# Patient Record
Sex: FEMALE | Race: WHITE | NOT HISPANIC OR LATINO | Employment: PART TIME
[De-identification: names, ages, dates, MRNs, and addresses within clinical notes are randomized per-mention and may not be internally consistent; named-entity substitution may affect disease eponyms.]

---

## 2017-01-05 ENCOUNTER — RECORDS - HEALTHEAST (OUTPATIENT)
Dept: ADMINISTRATIVE | Facility: OTHER | Age: 52
End: 2017-01-05

## 2017-01-20 ENCOUNTER — OFFICE VISIT - HEALTHEAST (OUTPATIENT)
Dept: FAMILY MEDICINE | Facility: CLINIC | Age: 52
End: 2017-01-20

## 2017-01-20 DIAGNOSIS — F32.89 DEPRESSIVE DISORDER, NOT ELSEWHERE CLASSIFIED: ICD-10-CM

## 2017-01-20 DIAGNOSIS — E03.8 SUBCLINICAL HYPOTHYROIDISM: ICD-10-CM

## 2017-01-20 DIAGNOSIS — F41.9 ANXIETY: ICD-10-CM

## 2017-01-20 DIAGNOSIS — I10 ESSENTIAL HYPERTENSION: ICD-10-CM

## 2017-01-20 ASSESSMENT — MIFFLIN-ST. JEOR: SCORE: 1598.22

## 2017-01-25 ENCOUNTER — RECORDS - HEALTHEAST (OUTPATIENT)
Dept: ADMINISTRATIVE | Facility: OTHER | Age: 52
End: 2017-01-25

## 2017-01-27 ENCOUNTER — AMBULATORY - HEALTHEAST (OUTPATIENT)
Dept: FAMILY MEDICINE | Facility: CLINIC | Age: 52
End: 2017-01-27

## 2017-01-27 ENCOUNTER — AMBULATORY - HEALTHEAST (OUTPATIENT)
Dept: LAB | Facility: CLINIC | Age: 52
End: 2017-01-27

## 2017-01-27 DIAGNOSIS — I10 ESSENTIAL HYPERTENSION: ICD-10-CM

## 2017-01-27 DIAGNOSIS — E03.8 SUBCLINICAL HYPOTHYROIDISM: ICD-10-CM

## 2017-01-27 DIAGNOSIS — Z13.9 SCREENING: ICD-10-CM

## 2017-01-27 LAB
CHOLEST SERPL-MCNC: 215 MG/DL
FASTING STATUS PATIENT QL REPORTED: YES
HDLC SERPL-MCNC: 49 MG/DL
LDLC SERPL CALC-MCNC: 124 MG/DL
TRIGL SERPL-MCNC: 209 MG/DL

## 2017-04-03 ENCOUNTER — HOSPITAL ENCOUNTER (OUTPATIENT)
Dept: MAMMOGRAPHY | Facility: CLINIC | Age: 52
Discharge: HOME | End: 2017-04-03
Attending: OBSTETRICS & GYNECOLOGY

## 2017-04-03 DIAGNOSIS — Z12.31 VISIT FOR SCREENING MAMMOGRAM: ICD-10-CM

## 2017-07-22 ENCOUNTER — COMMUNICATION - HEALTHEAST (OUTPATIENT)
Dept: FAMILY MEDICINE | Facility: CLINIC | Age: 52
End: 2017-07-22

## 2017-07-22 DIAGNOSIS — F32.89 DEPRESSIVE DISORDER, NOT ELSEWHERE CLASSIFIED: ICD-10-CM

## 2017-07-22 DIAGNOSIS — I10 ESSENTIAL HYPERTENSION: ICD-10-CM

## 2017-08-23 ENCOUNTER — RECORDS - HEALTHEAST (OUTPATIENT)
Dept: ADMINISTRATIVE | Facility: OTHER | Age: 52
End: 2017-08-23

## 2017-08-28 ENCOUNTER — OFFICE VISIT - HEALTHEAST (OUTPATIENT)
Dept: FAMILY MEDICINE | Facility: CLINIC | Age: 52
End: 2017-08-28

## 2017-08-28 DIAGNOSIS — M54.9 UPPER BACK PAIN ON LEFT SIDE: ICD-10-CM

## 2017-08-29 ENCOUNTER — OFFICE VISIT - HEALTHEAST (OUTPATIENT)
Dept: PHYSICAL THERAPY | Facility: REHABILITATION | Age: 52
End: 2017-08-29

## 2017-08-29 DIAGNOSIS — F41.9 ANXIETY: ICD-10-CM

## 2017-08-29 DIAGNOSIS — F32.89 DEPRESSIVE DISORDER, NOT ELSEWHERE CLASSIFIED: ICD-10-CM

## 2017-08-29 DIAGNOSIS — I10 HYPERTENSION: ICD-10-CM

## 2017-08-29 DIAGNOSIS — M25.612 SHOULDER STIFFNESS, LEFT: ICD-10-CM

## 2017-08-29 DIAGNOSIS — M25.512 ACUTE PAIN OF LEFT SHOULDER: ICD-10-CM

## 2017-08-30 ENCOUNTER — COMMUNICATION - HEALTHEAST (OUTPATIENT)
Dept: FAMILY MEDICINE | Facility: CLINIC | Age: 52
End: 2017-08-30

## 2017-08-31 ENCOUNTER — AMBULATORY - HEALTHEAST (OUTPATIENT)
Dept: FAMILY MEDICINE | Facility: CLINIC | Age: 52
End: 2017-08-31

## 2017-08-31 DIAGNOSIS — Z13.9 SCREENING: ICD-10-CM

## 2017-08-31 DIAGNOSIS — K21.9 GERD (GASTROESOPHAGEAL REFLUX DISEASE): ICD-10-CM

## 2017-10-23 ENCOUNTER — RECORDS - HEALTHEAST (OUTPATIENT)
Dept: ADMINISTRATIVE | Facility: OTHER | Age: 52
End: 2017-10-23

## 2017-10-26 ENCOUNTER — RECORDS - HEALTHEAST (OUTPATIENT)
Dept: ADMINISTRATIVE | Facility: OTHER | Age: 52
End: 2017-10-26

## 2017-11-13 ENCOUNTER — COMMUNICATION - HEALTHEAST (OUTPATIENT)
Dept: FAMILY MEDICINE | Facility: CLINIC | Age: 52
End: 2017-11-13

## 2017-11-14 ENCOUNTER — RECORDS - HEALTHEAST (OUTPATIENT)
Dept: ADMINISTRATIVE | Facility: OTHER | Age: 52
End: 2017-11-14

## 2017-11-22 ENCOUNTER — RECORDS - HEALTHEAST (OUTPATIENT)
Dept: ADMINISTRATIVE | Facility: OTHER | Age: 52
End: 2017-11-22

## 2017-12-01 ENCOUNTER — RECORDS - HEALTHEAST (OUTPATIENT)
Dept: ADMINISTRATIVE | Facility: OTHER | Age: 52
End: 2017-12-01

## 2017-12-11 ENCOUNTER — RECORDS - HEALTHEAST (OUTPATIENT)
Dept: ADMINISTRATIVE | Facility: OTHER | Age: 52
End: 2017-12-11

## 2017-12-20 ENCOUNTER — RECORDS - HEALTHEAST (OUTPATIENT)
Dept: ADMINISTRATIVE | Facility: OTHER | Age: 52
End: 2017-12-20

## 2018-01-23 ENCOUNTER — COMMUNICATION - HEALTHEAST (OUTPATIENT)
Dept: FAMILY MEDICINE | Facility: CLINIC | Age: 53
End: 2018-01-23

## 2018-01-23 DIAGNOSIS — I10 ESSENTIAL HYPERTENSION: ICD-10-CM

## 2018-01-27 ENCOUNTER — COMMUNICATION - HEALTHEAST (OUTPATIENT)
Dept: FAMILY MEDICINE | Facility: CLINIC | Age: 53
End: 2018-01-27

## 2018-05-04 ENCOUNTER — COMMUNICATION - HEALTHEAST (OUTPATIENT)
Dept: FAMILY MEDICINE | Facility: CLINIC | Age: 53
End: 2018-05-04

## 2018-05-04 DIAGNOSIS — F41.9 ANXIETY: ICD-10-CM

## 2018-05-04 DIAGNOSIS — F32.A DEPRESSION: ICD-10-CM

## 2018-05-04 DIAGNOSIS — I10 ESSENTIAL HYPERTENSION: ICD-10-CM

## 2018-08-01 ENCOUNTER — COMMUNICATION - HEALTHEAST (OUTPATIENT)
Dept: FAMILY MEDICINE | Facility: CLINIC | Age: 53
End: 2018-08-01

## 2018-08-01 DIAGNOSIS — I10 ESSENTIAL HYPERTENSION: ICD-10-CM

## 2018-08-09 ENCOUNTER — OFFICE VISIT - HEALTHEAST (OUTPATIENT)
Dept: FAMILY MEDICINE | Facility: CLINIC | Age: 53
End: 2018-08-09

## 2018-08-09 DIAGNOSIS — F41.9 ANXIETY: ICD-10-CM

## 2018-08-09 DIAGNOSIS — F32.0 MILD MAJOR DEPRESSION (H): ICD-10-CM

## 2018-08-09 DIAGNOSIS — K21.9 ESOPHAGEAL REFLUX: ICD-10-CM

## 2018-08-09 DIAGNOSIS — I10 ESSENTIAL HYPERTENSION: ICD-10-CM

## 2018-10-29 ENCOUNTER — OFFICE VISIT - HEALTHEAST (OUTPATIENT)
Dept: FAMILY MEDICINE | Facility: CLINIC | Age: 53
End: 2018-10-29

## 2018-10-29 DIAGNOSIS — H81.10 BPV (BENIGN POSITIONAL VERTIGO): ICD-10-CM

## 2018-10-29 DIAGNOSIS — Z12.31 VISIT FOR SCREENING MAMMOGRAM: ICD-10-CM

## 2019-06-17 ENCOUNTER — OFFICE VISIT - HEALTHEAST (OUTPATIENT)
Dept: FAMILY MEDICINE | Facility: CLINIC | Age: 54
End: 2019-06-17

## 2019-06-17 DIAGNOSIS — F51.01 PRIMARY INSOMNIA: ICD-10-CM

## 2019-06-17 DIAGNOSIS — F32.0 MILD MAJOR DEPRESSION (H): ICD-10-CM

## 2019-06-23 ENCOUNTER — COMMUNICATION - HEALTHEAST (OUTPATIENT)
Dept: FAMILY MEDICINE | Facility: CLINIC | Age: 54
End: 2019-06-23

## 2019-07-01 ENCOUNTER — COMMUNICATION - HEALTHEAST (OUTPATIENT)
Dept: FAMILY MEDICINE | Facility: CLINIC | Age: 54
End: 2019-07-01

## 2019-07-01 DIAGNOSIS — F51.01 PRIMARY INSOMNIA: ICD-10-CM

## 2019-07-14 ENCOUNTER — COMMUNICATION - HEALTHEAST (OUTPATIENT)
Dept: FAMILY MEDICINE | Facility: CLINIC | Age: 54
End: 2019-07-14

## 2019-07-14 DIAGNOSIS — F51.01 PRIMARY INSOMNIA: ICD-10-CM

## 2019-07-18 ENCOUNTER — COMMUNICATION - HEALTHEAST (OUTPATIENT)
Dept: FAMILY MEDICINE | Facility: CLINIC | Age: 54
End: 2019-07-18

## 2019-07-26 ENCOUNTER — COMMUNICATION - HEALTHEAST (OUTPATIENT)
Dept: FAMILY MEDICINE | Facility: CLINIC | Age: 54
End: 2019-07-26

## 2019-07-26 DIAGNOSIS — F51.01 PRIMARY INSOMNIA: ICD-10-CM

## 2019-07-29 ENCOUNTER — COMMUNICATION - HEALTHEAST (OUTPATIENT)
Dept: FAMILY MEDICINE | Facility: CLINIC | Age: 54
End: 2019-07-29

## 2019-07-30 ENCOUNTER — AMBULATORY - HEALTHEAST (OUTPATIENT)
Dept: FAMILY MEDICINE | Facility: CLINIC | Age: 54
End: 2019-07-30

## 2019-07-30 DIAGNOSIS — F51.01 PRIMARY INSOMNIA: ICD-10-CM

## 2019-07-31 ENCOUNTER — COMMUNICATION - HEALTHEAST (OUTPATIENT)
Dept: FAMILY MEDICINE | Facility: CLINIC | Age: 54
End: 2019-07-31

## 2019-07-31 DIAGNOSIS — F41.9 ANXIETY: ICD-10-CM

## 2019-07-31 DIAGNOSIS — F32.0 MILD MAJOR DEPRESSION (H): ICD-10-CM

## 2019-07-31 DIAGNOSIS — I10 ESSENTIAL HYPERTENSION: ICD-10-CM

## 2019-08-20 ENCOUNTER — RECORDS - HEALTHEAST (OUTPATIENT)
Dept: ADMINISTRATIVE | Facility: OTHER | Age: 54
End: 2019-08-20

## 2019-08-23 ENCOUNTER — COMMUNICATION - HEALTHEAST (OUTPATIENT)
Dept: FAMILY MEDICINE | Facility: CLINIC | Age: 54
End: 2019-08-23

## 2019-08-23 DIAGNOSIS — F51.01 PRIMARY INSOMNIA: ICD-10-CM

## 2019-09-22 ENCOUNTER — COMMUNICATION - HEALTHEAST (OUTPATIENT)
Dept: FAMILY MEDICINE | Facility: CLINIC | Age: 54
End: 2019-09-22

## 2019-09-22 DIAGNOSIS — F51.01 PRIMARY INSOMNIA: ICD-10-CM

## 2020-01-02 ENCOUNTER — RECORDS - HEALTHEAST (OUTPATIENT)
Dept: ADMINISTRATIVE | Facility: OTHER | Age: 55
End: 2020-01-02

## 2020-01-06 ENCOUNTER — RECORDS - HEALTHEAST (OUTPATIENT)
Dept: ADMINISTRATIVE | Facility: OTHER | Age: 55
End: 2020-01-06

## 2020-02-17 ENCOUNTER — OFFICE VISIT - HEALTHEAST (OUTPATIENT)
Dept: FAMILY MEDICINE | Facility: CLINIC | Age: 55
End: 2020-02-17

## 2020-02-17 DIAGNOSIS — J20.9 BRONCHITIS WITH BRONCHOSPASM: ICD-10-CM

## 2020-02-17 RX ORDER — SPIRONOLACTONE 100 MG/1
TABLET, FILM COATED ORAL
Refills: 5 | Status: SHIPPED | COMMUNITY
Start: 2019-08-29 | End: 2024-02-14

## 2020-02-17 RX ORDER — ALBUTEROL SULFATE 90 UG/1
2 AEROSOL, METERED RESPIRATORY (INHALATION) EVERY 6 HOURS PRN
Qty: 18 G | Refills: 11 | Status: SHIPPED | OUTPATIENT
Start: 2020-02-17 | End: 2024-02-14

## 2020-06-12 ENCOUNTER — COMMUNICATION - HEALTHEAST (OUTPATIENT)
Dept: FAMILY MEDICINE | Facility: CLINIC | Age: 55
End: 2020-06-12

## 2020-06-12 DIAGNOSIS — Z23 NEED FOR VACCINATION: ICD-10-CM

## 2020-06-14 ENCOUNTER — COMMUNICATION - HEALTHEAST (OUTPATIENT)
Dept: FAMILY MEDICINE | Facility: CLINIC | Age: 55
End: 2020-06-14

## 2020-06-14 DIAGNOSIS — F51.01 PRIMARY INSOMNIA: ICD-10-CM

## 2020-06-15 ENCOUNTER — AMBULATORY - HEALTHEAST (OUTPATIENT)
Dept: LAB | Facility: CLINIC | Age: 55
End: 2020-06-15

## 2020-06-15 DIAGNOSIS — Z23 NEED FOR VACCINATION: ICD-10-CM

## 2020-06-16 LAB
MEV IGG SER IA-ACNC: POSITIVE
MUV IGG SER QL IA: POSITIVE
RUBV IGG SERPL QL IA: POSITIVE

## 2020-07-05 ENCOUNTER — VIRTUAL VISIT (OUTPATIENT)
Dept: FAMILY MEDICINE | Facility: OTHER | Age: 55
End: 2020-07-05

## 2020-07-05 DIAGNOSIS — Z20.822 COVID-19 RULED OUT: Primary | ICD-10-CM

## 2020-07-05 PROCEDURE — 99207 ZZC NO BILLABLE SERVICE THIS VISIT: CPT

## 2020-07-05 PROCEDURE — U0003 INFECTIOUS AGENT DETECTION BY NUCLEIC ACID (DNA OR RNA); SEVERE ACUTE RESPIRATORY SYNDROME CORONAVIRUS 2 (SARS-COV-2) (CORONAVIRUS DISEASE [COVID-19]), AMPLIFIED PROBE TECHNIQUE, MAKING USE OF HIGH THROUGHPUT TECHNOLOGIES AS DESCRIBED BY CMS-2020-01-R: HCPCS | Performed by: FAMILY MEDICINE

## 2020-07-05 NOTE — LETTER
July 7, 2020        Gualberto Saunders  [address] [facility] [address]    This letter provides a written record that you were tested for COVID-19 on  7/5/20      Your result was negative. This means that we didn t find the virus that causes COVID-19 in your sample. A test may show negative when you do actually have the virus. This can happen when the virus is in the early stages of infection, before you feel illness symptoms.    If you have symptoms   Stay home and away from others (self-isolate) until you meet ALL of the guidelines below:    You ve had no fever--and no medicine that reduces fever--for 3 full days (72 hours). And      Your other symptoms have gotten better. For example, your cough or breathing has improved. And     At least 10 days have passed since your symptoms started.    During this time:    Stay home. Don t go to work, school or anywhere else.     Stay in your own room, including for meals. Use your own bathroom if you can.    Stay away from others in your home. No hugging, kissing or shaking hands. No visitors.    Clean  high touch  surfaces often (doorknobs, counters, handles, etc.). Use a household cleaning spray or wipes. You can find a full list on the EPA website at www.epa.gov/pesticide-registration/list-n-disinfectants-use-against-sars-cov-2.    Cover your mouth and nose with a mask, tissue or washcloth to avoid spreading germs.    Wash your hands and face often with soap and water.    Going back to work  Check with your employer for any guidelines to follow for going back to work.    Employers: This document serves as formal notice that your employee tested negative for COVID-19, as of the testing date shown above.

## 2020-07-06 LAB
SARS-COV-2 RNA SPEC QL NAA+PROBE: NOT DETECTED
SPECIMEN SOURCE: NORMAL

## 2020-07-07 NOTE — PROGRESS NOTES
"Date: 2020 12:53:54  Clinician: Kely Trivedi  Clinician NPI: [id number]  Patient: Gualberto Saunders  Patient : 1965  Patient Address: [address] [place] [address]  Patient Phone: [phone]  Visit Protocol: URI  Patient Summary:  Gualberto is a 55 year old ( : 1965 ) female who initiated a Visit for COVID-19 (Coronavirus) evaluation and screening. When asked the question \"Please sign me up to receive news, health information and promotions from [employer].\", Gualberto responded \"No\".    Gualberto states her symptoms started 1-2 days ago.   Her symptoms consist of nausea, diarrhea, vomiting, malaise, a headache, and myalgia.   Symptom details   Headache: She states the headache is moderate (4-6 on a 10 point pain scale).    Gualberto denies having wheezing, teeth pain, ageusia, rhinitis, ear pain, chills, sore throat, enlarged lymph nodes, anosmia, facial pain or pressure, fever, cough, and nasal congestion. She also denies having recent facial or sinus surgery in the past 60 days and taking antibiotic medication in the past month. She is not experiencing dyspnea.   Precipitating events  She has not recently been exposed to someone with influenza. Gualberto has been in close contact with the following high risk individuals: children under the age of 5, people with asthma, heart disease or diabetes, and adults 65 or older.   Pertinent COVID-19 (Coronavirus) information  In the past 14 days, Gualberto has not worked in a congregate living setting.   She either works or volunteers as a healthcare worker or a , or works or volunteers in a healthcare facility. She provides direct patient care. 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 She lives with a healthcare worker.   Gualberto has had a close contact with a laboratory-confirmed " COVID-19 patient within 14 days of symptom onset. She was not exposed at her work. Additional information about contact with COVID-19 (Coronavirus) patient as reported by the patient (free text): Exposed 7 days ago to son in laws sister who I had close contact with   Pertinent medical history  Gualberto does not get yeast infections when she takes antibiotics.   Gualberto does not need a return to work/school note.   Weight: 199 lbs   Gualberto does not smoke or use smokeless tobacco.   Weight: 199 lbs    MEDICATIONS: Pepcid oral, Lexapro oral, atenolol oral, ALLERGIES: NKDA  Clinician Response:  Dear Gualberto,   Your symptoms show that you may have coronavirus (COVID-19). This illness can cause fever, cough and trouble breathing. Many people get a mild case and get better on their own. Some people can get very sick.  What should I do?  We would like to test you for this virus.   1. Please call [phone] to schedule your visit. Explain that you were referred by [facility] to have a COVID-19 test. Be ready to share your [facility] visit ID number.  The following will serve as your written order for this COVID Test, ordered by me, for the indication of suspected COVID [Z20.828]: The test will be ordered in [facility] [employer], our electronic health record, after you are scheduled. It will show as ordered and authorized by Denis Ludwig MD.  Order: COVID-19 (Coronavirus) PCR for SYMPTOMATIC testing from [facility].      2. When it's time for your COVID test:  Stay at least 6 feet away from others. (If someone will drive you to your test, stay in the backseat, as far away from the  as you can.)   Cover your mouth and nose with a mask, tissue or washcloth.  Go straight to the testing site. Don't make any stops on the way there or back.      3.Starting now: Stay home and away from others (self-isolate) until:   You've had no fever---and no medicine that reduces fever---for 3 full days (72 hours). And...   Your other symptoms have gotten better. For  "example, your cough or breathing has improved. And...   At least 10 days have passed since your symptoms started.       During this time, don't leave the house except for testing or medical care.   Stay in your own room, even for meals. Use your own bathroom if you can.   Stay away from others in your home. No hugging, kissing or shaking hands. No visitors.  Don't go to work, school or anywhere else.    Clean \"high touch\" surfaces often (doorknobs, counters, handles, etc.). Use a household cleaning spray or wipes. You'll find a full list of  on the EPA website: www.epa.gov/pesticide-registration/list-n-disinfectants-use-against-sars-cov-2.   Cover your mouth and nose with a mask, tissue or washcloth to avoid spreading germs.  Wash your hands and face often. Use soap and water.  Caregivers in these groups are at risk for severe illness due to COVID-19:  o People 65 years and older  o People who live in a nursing home or long-term care facility  o People with chronic disease (lung, heart, cancer, diabetes, kidney, liver, immunologic)  o People who have a weakened immune system, including those who:   Are in cancer treatment  Take medicine that weakens the immune system, such as corticosteroids  Had a bone marrow or organ transplant  Have an immune deficiency  Have poorly controlled HIV or AIDS  Are obese (body mass index of 40 or higher)  Smoke regularly   o Caregivers should wear gloves while washing dishes, handling laundry and cleaning bedrooms and bathrooms.  o Use caution when washing and drying laundry: Don't shake dirty laundry, and use the warmest water setting that you can.  o For more tips, go to www.cdc.gov/coronavirus/2019-ncov/downloads/10Things.pdf.    4.Sign up for [employer] [employer]. We know it's scary to hear that you might have COVID-19. We want to track your symptoms to make sure you're okay over the next 2 weeks. Please look for an email from [employer]---this is a free, online program that " we'll use to keep in touch. To sign up, follow the link in the email. Learn more at [email or web address]  How can I take care of myself?   Get lots of rest. Drink extra fluids (unless a doctor has told you not to).   Take Tylenol (acetaminophen) for fever or pain. If you have liver or kidney problems, ask your family doctor if it's okay to take Tylenol.   Adults can take either:    650 mg (two 325 mg pills) every 4 to 6 hours, or...   1,000 mg (two 500 mg pills) every 8 hours as needed.    Note: Don't take more than 3,000 mg in one day. Acetaminophen is found in many medicines (both prescribed and over-the-counter medicines). Read all labels to be sure you don't take too much.   For children, check the Tylenol bottle for the right dose. The dose is based on the child's age or weight.    If you have other health problems (like cancer, heart failure, an organ transplant or severe kidney disease): Call your specialty clinic if you don't feel better in the next 2 days.       Know when to call 911. Emergency warning signs include:    Trouble breathing or shortness of breath Pain or pressure in the chest that doesn't go away Feeling confused like you haven't felt before, or not being able to wake up Bluish-colored lips or face.  Where can I get more information?   [facility] -- About COVID-19: [email or web address]   CDC -- What to Do If You're Sick: www.cdc.gov/coronavirus/2019-ncov/about/steps-when-sick.html   CDC -- Ending Home Isolation: www.cdc.gov/coronavirus/2019-ncov/hcp/disposition-in-home-patients.html   CDC -- Caring for Someone: www.cdc.gov/coronavirus/2019-ncov/if-you-are-sick/care-for-someone.html   [facility] -- Interim Guidance for Hospital Discharge to Home: www.health.[facility].mn.us/diseases/coronavirus/hcp/hospdischarge.pdf   [facility] clinical trials (COVID-19 research studies): clinicalaffairs.[facility]/umn-clinical-trials    Below are the COVID-19 hotlines at the  [place] Department of [facility] ([facility]). Interpreters are available.    For health questions: Call 132-566-8497 or 1-864.685.2514 (7 a.m. to 7 p.m.) For questions about schools and childcare: Call 733-422-9852 or 1-886.789.5112 (7 a.m. to 7 p.m.)    Diagnosis: Myalgia  Diagnosis ICD: M79.1

## 2020-07-10 ENCOUNTER — COMMUNICATION - HEALTHEAST (OUTPATIENT)
Dept: FAMILY MEDICINE | Facility: CLINIC | Age: 55
End: 2020-07-10

## 2020-07-11 ENCOUNTER — COMMUNICATION - HEALTHEAST (OUTPATIENT)
Dept: FAMILY MEDICINE | Facility: CLINIC | Age: 55
End: 2020-07-11

## 2020-07-11 DIAGNOSIS — Z13.9 SCREENING FOR CONDITION: ICD-10-CM

## 2020-07-11 DIAGNOSIS — Z00.00 ROUTINE GENERAL MEDICAL EXAMINATION AT A HEALTH CARE FACILITY: ICD-10-CM

## 2020-07-14 ENCOUNTER — AMBULATORY - HEALTHEAST (OUTPATIENT)
Dept: LAB | Facility: CLINIC | Age: 55
End: 2020-07-14

## 2020-07-14 ENCOUNTER — OFFICE VISIT - HEALTHEAST (OUTPATIENT)
Dept: FAMILY MEDICINE | Facility: CLINIC | Age: 55
End: 2020-07-14

## 2020-07-14 DIAGNOSIS — F32.0 MILD MAJOR DEPRESSION (H): ICD-10-CM

## 2020-07-14 DIAGNOSIS — Z13.9 SCREENING FOR CONDITION: ICD-10-CM

## 2020-07-14 DIAGNOSIS — J45.20 MILD INTERMITTENT ASTHMA WITHOUT COMPLICATION: ICD-10-CM

## 2020-07-14 DIAGNOSIS — E03.8 SUBCLINICAL HYPOTHYROIDISM: ICD-10-CM

## 2020-07-14 DIAGNOSIS — Z00.00 ROUTINE GENERAL MEDICAL EXAMINATION AT A HEALTH CARE FACILITY: ICD-10-CM

## 2020-07-14 DIAGNOSIS — Z76.89 ENCOUNTER TO ESTABLISH CARE: ICD-10-CM

## 2020-07-14 DIAGNOSIS — I10 ESSENTIAL HYPERTENSION: ICD-10-CM

## 2020-07-14 DIAGNOSIS — Z13.71 BRCA NEGATIVE: ICD-10-CM

## 2020-07-14 DIAGNOSIS — Z20.822 SUSPECTED COVID-19 VIRUS INFECTION: ICD-10-CM

## 2020-07-14 DIAGNOSIS — K21.9 GASTROESOPHAGEAL REFLUX DISEASE WITHOUT ESOPHAGITIS: ICD-10-CM

## 2020-07-14 DIAGNOSIS — K44.9 HIATAL HERNIA: ICD-10-CM

## 2020-07-14 DIAGNOSIS — F41.9 ANXIETY: ICD-10-CM

## 2020-07-14 LAB
ANION GAP SERPL CALCULATED.3IONS-SCNC: 12 MMOL/L (ref 5–18)
BUN SERPL-MCNC: 10 MG/DL (ref 8–22)
CALCIUM SERPL-MCNC: 9.4 MG/DL (ref 8.5–10.5)
CHLORIDE BLD-SCNC: 104 MMOL/L (ref 98–107)
CHOLEST SERPL-MCNC: 225 MG/DL
CO2 SERPL-SCNC: 24 MMOL/L (ref 22–31)
CREAT SERPL-MCNC: 0.73 MG/DL (ref 0.6–1.1)
ERYTHROCYTE [DISTWIDTH] IN BLOOD BY AUTOMATED COUNT: 12.2 % (ref 11–14.5)
FASTING STATUS PATIENT QL REPORTED: YES
GFR SERPL CREATININE-BSD FRML MDRD: >60 ML/MIN/1.73M2
GLUCOSE BLD-MCNC: 97 MG/DL (ref 70–125)
HCT VFR BLD AUTO: 47.1 % (ref 35–47)
HDLC SERPL-MCNC: 44 MG/DL
HGB BLD-MCNC: 16.5 G/DL (ref 12–16)
LDLC SERPL CALC-MCNC: 135 MG/DL
MCH RBC QN AUTO: 34.2 PG (ref 27–34)
MCHC RBC AUTO-ENTMCNC: 35.1 G/DL (ref 32–36)
MCV RBC AUTO: 97 FL (ref 80–100)
PLATELET # BLD AUTO: 228 THOU/UL (ref 140–440)
PMV BLD AUTO: 7.9 FL (ref 7–10)
POTASSIUM BLD-SCNC: 4.9 MMOL/L (ref 3.5–5)
RBC # BLD AUTO: 4.84 MILL/UL (ref 3.8–5.4)
SODIUM SERPL-SCNC: 140 MMOL/L (ref 136–145)
T4 FREE SERPL-MCNC: 0.7 NG/DL (ref 0.7–1.8)
TRIGL SERPL-MCNC: 230 MG/DL
TSH SERPL DL<=0.005 MIU/L-ACNC: 7.99 UIU/ML (ref 0.3–5)
WBC: 7.2 THOU/UL (ref 4–11)

## 2020-07-14 RX ORDER — DEXAMETHASONE 4 MG/1
TABLET ORAL
Qty: 1 INHALER | Refills: 12 | Status: SHIPPED | OUTPATIENT
Start: 2020-07-14 | End: 2022-01-22

## 2020-07-14 RX ORDER — ESCITALOPRAM OXALATE 10 MG/1
10 TABLET ORAL DAILY
Qty: 90 TABLET | Refills: 3 | Status: SHIPPED | OUTPATIENT
Start: 2020-07-14 | End: 2021-09-20

## 2020-07-14 ASSESSMENT — ANXIETY QUESTIONNAIRES
1. FEELING NERVOUS, ANXIOUS, OR ON EDGE: NOT AT ALL
IF YOU CHECKED OFF ANY PROBLEMS ON THIS QUESTIONNAIRE, HOW DIFFICULT HAVE THESE PROBLEMS MADE IT FOR YOU TO DO YOUR WORK, TAKE CARE OF THINGS AT HOME, OR GET ALONG WITH OTHER PEOPLE: NOT DIFFICULT AT ALL
5. BEING SO RESTLESS THAT IT IS HARD TO SIT STILL: NOT AT ALL
4. TROUBLE RELAXING: SEVERAL DAYS
3. WORRYING TOO MUCH ABOUT DIFFERENT THINGS: NOT AT ALL
7. FEELING AFRAID AS IF SOMETHING AWFUL MIGHT HAPPEN: NOT AT ALL
2. NOT BEING ABLE TO STOP OR CONTROL WORRYING: NOT AT ALL
GAD7 TOTAL SCORE: 1
6. BECOMING EASILY ANNOYED OR IRRITABLE: NOT AT ALL

## 2020-07-14 ASSESSMENT — PATIENT HEALTH QUESTIONNAIRE - PHQ9: SUM OF ALL RESPONSES TO PHQ QUESTIONS 1-9: 2

## 2020-07-14 ASSESSMENT — MIFFLIN-ST. JEOR: SCORE: 1637.46

## 2020-07-15 LAB
25(OH)D3 SERPL-MCNC: 46.6 NG/ML (ref 30–80)
25(OH)D3 SERPL-MCNC: 46.6 NG/ML (ref 30–80)

## 2020-07-16 ENCOUNTER — COMMUNICATION - HEALTHEAST (OUTPATIENT)
Dept: PEDIATRICS | Facility: CLINIC | Age: 55
End: 2020-07-16

## 2020-07-16 ENCOUNTER — COMMUNICATION - HEALTHEAST (OUTPATIENT)
Dept: FAMILY MEDICINE | Facility: CLINIC | Age: 55
End: 2020-07-16

## 2020-07-20 ENCOUNTER — OFFICE VISIT - HEALTHEAST (OUTPATIENT)
Dept: FAMILY MEDICINE | Facility: CLINIC | Age: 55
End: 2020-07-20

## 2020-07-20 DIAGNOSIS — E78.2 MIXED HYPERLIPIDEMIA: ICD-10-CM

## 2020-07-20 DIAGNOSIS — R74.8 LOW SERUM HDL: ICD-10-CM

## 2020-07-20 DIAGNOSIS — E78.1 HIGH TRIGLYCERIDES: ICD-10-CM

## 2020-07-20 DIAGNOSIS — E03.8 SUBCLINICAL HYPOTHYROIDISM: ICD-10-CM

## 2020-07-21 ENCOUNTER — COMMUNICATION - HEALTHEAST (OUTPATIENT)
Dept: FAMILY MEDICINE | Facility: CLINIC | Age: 55
End: 2020-07-21

## 2020-07-21 ENCOUNTER — COMMUNICATION - HEALTHEAST (OUTPATIENT)
Dept: LAB | Facility: CLINIC | Age: 55
End: 2020-07-21

## 2020-09-11 ENCOUNTER — COMMUNICATION - HEALTHEAST (OUTPATIENT)
Dept: FAMILY MEDICINE | Facility: CLINIC | Age: 55
End: 2020-09-11

## 2020-09-11 DIAGNOSIS — I10 ESSENTIAL HYPERTENSION: ICD-10-CM

## 2020-09-11 DIAGNOSIS — Z20.822 EXPOSURE TO COVID-19 VIRUS: ICD-10-CM

## 2020-09-12 ENCOUNTER — AMBULATORY - HEALTHEAST (OUTPATIENT)
Dept: FAMILY MEDICINE | Facility: CLINIC | Age: 55
End: 2020-09-12

## 2020-09-12 DIAGNOSIS — Z20.822 EXPOSURE TO COVID-19 VIRUS: ICD-10-CM

## 2020-09-14 ENCOUNTER — COMMUNICATION - HEALTHEAST (OUTPATIENT)
Dept: SCHEDULING | Facility: CLINIC | Age: 55
End: 2020-09-14

## 2020-10-13 ENCOUNTER — COMMUNICATION - HEALTHEAST (OUTPATIENT)
Dept: FAMILY MEDICINE | Facility: CLINIC | Age: 55
End: 2020-10-13

## 2020-10-13 ENCOUNTER — AMBULATORY - HEALTHEAST (OUTPATIENT)
Dept: LAB | Facility: CLINIC | Age: 55
End: 2020-10-13

## 2020-10-13 ENCOUNTER — AMBULATORY - HEALTHEAST (OUTPATIENT)
Dept: FAMILY MEDICINE | Facility: CLINIC | Age: 55
End: 2020-10-13

## 2020-10-13 DIAGNOSIS — E03.8 SUBCLINICAL HYPOTHYROIDISM: ICD-10-CM

## 2020-10-13 LAB — TSH SERPL DL<=0.005 MIU/L-ACNC: 5.8 UIU/ML (ref 0.3–5)

## 2021-01-20 ENCOUNTER — AMBULATORY - HEALTHEAST (OUTPATIENT)
Dept: NURSING | Facility: CLINIC | Age: 56
End: 2021-01-20

## 2021-02-08 ENCOUNTER — AMBULATORY - HEALTHEAST (OUTPATIENT)
Dept: NURSING | Facility: CLINIC | Age: 56
End: 2021-02-08

## 2021-02-25 ENCOUNTER — RECORDS - HEALTHEAST (OUTPATIENT)
Dept: ADMINISTRATIVE | Facility: OTHER | Age: 56
End: 2021-02-25

## 2021-03-08 ENCOUNTER — COMMUNICATION - HEALTHEAST (OUTPATIENT)
Dept: FAMILY MEDICINE | Facility: CLINIC | Age: 56
End: 2021-03-08

## 2021-03-08 DIAGNOSIS — F51.01 PRIMARY INSOMNIA: ICD-10-CM

## 2021-03-08 RX ORDER — DOXEPIN HYDROCHLORIDE 10 MG/1
10 CAPSULE ORAL AT BEDTIME
Qty: 90 CAPSULE | Refills: 1 | Status: SHIPPED | OUTPATIENT
Start: 2021-03-08 | End: 2021-09-13

## 2021-03-31 ENCOUNTER — HOSPITAL ENCOUNTER (OUTPATIENT)
Dept: MAMMOGRAPHY | Facility: CLINIC | Age: 56
Discharge: HOME | End: 2021-03-31
Attending: OBSTETRICS & GYNECOLOGY

## 2021-03-31 DIAGNOSIS — Z12.31 VISIT FOR SCREENING MAMMOGRAM: ICD-10-CM

## 2021-04-10 ENCOUNTER — COMMUNICATION - HEALTHEAST (OUTPATIENT)
Dept: FAMILY MEDICINE | Facility: CLINIC | Age: 56
End: 2021-04-10

## 2021-04-10 DIAGNOSIS — E03.8 SUBCLINICAL HYPOTHYROIDISM: ICD-10-CM

## 2021-04-12 RX ORDER — LEVOTHYROXINE SODIUM 50 UG/1
TABLET ORAL
Qty: 90 TABLET | Refills: 1 | Status: SHIPPED | OUTPATIENT
Start: 2021-04-12 | End: 2021-10-12

## 2021-05-25 ENCOUNTER — RECORDS - HEALTHEAST (OUTPATIENT)
Dept: ADMINISTRATIVE | Facility: CLINIC | Age: 56
End: 2021-05-25

## 2021-05-26 ENCOUNTER — RECORDS - HEALTHEAST (OUTPATIENT)
Dept: ADMINISTRATIVE | Facility: CLINIC | Age: 56
End: 2021-05-26

## 2021-05-27 ASSESSMENT — PATIENT HEALTH QUESTIONNAIRE - PHQ9: SUM OF ALL RESPONSES TO PHQ QUESTIONS 1-9: 2

## 2021-05-28 ASSESSMENT — ANXIETY QUESTIONNAIRES: GAD7 TOTAL SCORE: 1

## 2021-05-28 ASSESSMENT — ASTHMA QUESTIONNAIRES: ACT_TOTALSCORE: 25

## 2021-05-29 ENCOUNTER — RECORDS - HEALTHEAST (OUTPATIENT)
Dept: ADMINISTRATIVE | Facility: CLINIC | Age: 56
End: 2021-05-29

## 2021-05-29 NOTE — PROGRESS NOTES
ASSESSMENT/PLAN:  Primary insomnia  54-year-old female with long-standing insomnia who resented today because her usual combination of melatonin and Unisom have not been as effective.  I will try her on trazodone.  We spoke about side effects of trazodone.  We spoke about sleep hygiene and to avoid bright light at least 30 minutes prior to bedtime.  Follow-up in the next 2 to 4 weeks if she has further questions or concerns.  The patient verbalized understanding and agree with the plan  -     traZODone (DESYREL) 50 MG tablet; Take 1 tablet (50 mg total) by mouth at bedtime.    Mild major depression (H)  Stable on Lexapro  No change of medication    SUBJECTIVE:    Gualberto Saunders is a 54 y.o. female who came in today for insomnia.  Patient has had insomnia since she turned 51 years old which was 3 years ago.  Since then she has been using melatonin 10 mg in addition to Unisom.  This combination of medication has not been as effective.  She describes difficulty with falling asleep.  States that she watches TV until she falls asleep.  While watching TV, usually around 9:30-9:40 is when she gets sleepy.  However when she goes to her room to sleep she finds herself wide-awake.  Denies depression or anxiety symptoms causing her insomnia.  She does not nap during the day.  States that Lexapro has been helping with her symptoms.  She wakes up usually around 5:00 in the morning to go to work    Review of Systems (except those mentioned above)  Constitutional: Negative.   HENT: Negative.   Eyes: Negative.   Respiratory: Negative.   Cardiovascular: Negative.   Gastrointestinal: Negative.   Endocrine: Negative.   Genitourinary: Negative.   Musculoskeletal: Negative.   Skin: Negative.   Allergic/Immunologic: Negative.   Neurological: Negative.   Hematological: Negative.   Psychiatric/Behavioral: Negative.     Patient Active Problem List    Diagnosis Date Noted     Mild major depression (H) 08/09/2018     Anxiety 01/20/2017      S/P hysterectomy 10/20/2015     Subclinical hypothyroidism 06/05/2015     Hypertension 06/04/2015     Perimenopause 06/04/2015     Vitiligo 12/02/2014     Basal Cell Carcinoma Of The Skin      Cystocele      Esophageal Reflux      Metatarsal Spur Of The Left Foot      Allergies   Allergen Reactions     Vicryl Sutures Swelling     Current Outpatient Medications   Medication Sig Dispense Refill     albuterol (PROVENTIL HFA;VENTOLIN HFA) 90 mcg/actuation inhaler Inhale 2 puffs every 6 (six) hours as needed for wheezing. 18 g 11     atenolol (TENORMIN) 25 MG tablet Take 1 tablet (25 mg total) by mouth daily. 90 tablet 3     escitalopram oxalate (LEXAPRO) 10 MG tablet Take 1 tablet (10 mg total) by mouth daily. 90 tablet 3     FLOVENT  mcg/actuation inhaler TAKE 1 PUFF BY MOUTH TWICE A DAY  12     omeprazole (PRILOSEC) 10 MG capsule Take 10 mg by mouth daily before breakfast.       lansoprazole (PREVACID) 30 MG capsule Take 30 mg by mouth daily.       traZODone (DESYREL) 50 MG tablet Take 1 tablet (50 mg total) by mouth at bedtime. 30 tablet 0     No current facility-administered medications for this visit.      Past Medical History:   Diagnosis Date     Depression      Elevated hemoglobin (H)      Hypertension      PONV (postoperative nausea and vomiting)      Uterine fibroid      Past Surgical History:   Procedure Laterality Date     ABLATION SAPHENOUS VEIN W/ RFA       HYSTERECTOMY  2015     [place] LAP,DIAGNOSTIC ABDOMEN      Description: Laparoscopy (Diagnostic);  Recorded: 09/03/2009;  Comments: for infertility     [place] LIGATE FALLOPIAN TUBE      Description: Tubal Ligation;  Recorded: 03/23/2009;     [place] REDUCTION OF LARGE BREAST      Description: Breast Surgery Reduction Procedure Elective;  Proc Date: 08/01/2009;     [place] REMOVE TONSILS/ADENOIDS,<13 Y/O      Description: Tonsillectomy With Adenoidectomy;  Recorded: 03/23/2009;     [place] VAGINAL HYSTERECTOMY,UTERUS 250 GMS/< N/A 10/20/2015    Procedure: HYSTERECTOMY  VAGINAL;  Surgeon: Dominique Ngo MD;  Location: [facility] [place] OR;  Service: Gynecology     REDUCTION MAMMAPLASTY  2007     WISDOM TOOTH EXTRACTION       Social History     Socioeconomic History     Marital status:      Spouse name: None     Number of children: None     Years of education: None     Highest education level: None   Occupational History     Occupation: nurse     Employer: [employer]   Social Needs     Financial resource strain: None     Food insecurity:     Worry: None     Inability: None     Transportation needs:     Medical: None     Non-medical: None   Tobacco Use     Smoking status: Never Smoker     Smokeless tobacco: Never Used   Substance and Sexual Activity     Alcohol use: Yes     Alcohol/week: 0.5 oz     Types: 1 drink(s) per week     Drug use: None     Sexual activity: Yes     Partners: Male   Lifestyle     Physical activity:     Days per week: None     Minutes per session: None     Stress: None   Relationships     Social connections:     Talks on phone: None     Gets together: None     Attends [employer] service: None     Active member of club or organization: None     Attends meetings of clubs or organizations: None     Relationship status: None     Intimate partner violence:     Fear of current or ex partner: None     Emotionally abused: None     Physically abused: None     Forced sexual activity: None   Other Topics Concern     None   Social History Narrative     None     Family History   Problem Relation Age of Onset     Cancer Father         thyroid     Prostate cancer Father      Idiopathic pulmonary fibrosis Father      No Medical Problems Brother      Other Mother         ARDS (acute respiratory distress syndrome)     Pancreatitis Mother         exploratory laparotomy revealed absence of pancreas, possibly autoimmune     Breast cancer Paternal Grandmother 35     Colon polyps Daughter         pre CA- 22         OBJECTIVE:    Vitals:    06/17/19 1528   BP: 112/78    Patient Site: Left Arm   Patient Position: Sitting   Cuff Size: Adult Regular   Pulse: 68   Weight: 202 lb 6 oz (91.8 kg)     Body mass index is 30.55 kg/m .    Physical Exam:  Constitutional: Patient is oriented to person, place, and time. Patient appears well-developed and well-nourished. No distress.   Head: Normocephalic and atraumatic.   Right Ear: External ear normal.   Left Ear: External ear normal.   Nose: Nose normal.   Eyes: Conjunctivae and EOM are normal. Right eye exhibits no discharge. Left eye exhibits no discharge. No scleral icterus.   Neurological: Patient is alert and oriented to person, place, and time. Patient has normal reflexes. No cranial nerve deficit. Coordination normal.   Skin: No rash noted. Patient is not diaphoretic. No erythema. No pallor.  The patient has good eye contact.  No psychomotor retardation or stereotypical behaviors.  Speech was regular rate, regular rhythm, adequate responses.  Mood was stable and affect was congruent mood.  No suicidal or homicidal intent.  No hallucination.

## 2021-05-30 VITALS — HEIGHT: 68 IN | WEIGHT: 208.5 LBS | BODY MASS INDEX: 31.6 KG/M2

## 2021-05-30 NOTE — TELEPHONE ENCOUNTER
RN cannot approve Refill Request    RN can NOT refill this medication change in dose sent in 7/2/19. . Last office visit: 6/17/2019 Pablito Bobby MD Last Physical: Visit date not found Last MTM visit: Visit date not found Last visit same specialty: 6/17/2019 Pablito Bobby MD.  Next visit within 3 mo: Visit date not found  Next physical within 3 mo: Visit date not found      Andreia Coelho, [facility] Triage/Med Refill 7/14/2019    Requested Prescriptions   Pending Prescriptions Disp Refills     traZODone (DESYREL) 50 MG tablet [Pharmacy Med Name: TRAZODONE 50 MG TABLET] 30 tablet 0     Sig: TAKE 1 TABLET BY MOUTH EVERYDAY AT BEDTIME       Tricyclics/Misc Antidepressant/Antianxiety Meds Refill Protocol Passed - 7/14/2019 12:26 AM        Passed - PCP or prescribing provider visit in last year     Last office visit with prescriber/PCP: 6/17/2019 Pablito Bobby MD OR same dept: 6/17/2019 Pablito Bobby MD OR same specialty: 6/17/2019 Pablito Bobby MD  Last physical: Visit date not found Last MTM visit: Visit date not found   Next visit within 3 mo: Visit date not found  Next physical within 3 mo: Visit date not found  Prescriber OR PCP: Pablito Walker MD  Last diagnosis associated with med order: 1. Primary insomnia  - traZODone (DESYREL) 50 MG tablet [Pharmacy Med Name: TRAZODONE 50 MG TABLET]; TAKE 1 TABLET BY MOUTH EVERYDAY AT BEDTIME  Dispense: 30 tablet; Refill: 0    If protocol passes may refill for 12 months if within 3 months of last provider visit (or a total of 15 months).

## 2021-05-30 NOTE — TELEPHONE ENCOUNTER
Who is calling:  The patient   Reason for Call:  The patient is calling to check the status of the medication request. The patient is aware that the script is pending review. The patient is requesting an expedited refill due to being nearly out of the medication.   Date of last appointment with primary care: 10/29/2018  Okay to leave a detailed message: Yes

## 2021-05-31 VITALS — BODY MASS INDEX: 29.89 KG/M2 | WEIGHT: 198 LBS

## 2021-05-31 NOTE — TELEPHONE ENCOUNTER
Refill Approved    Rx renewed per Medication Renewal Policy. Medication was last renewed on 8/9/18.    Rachele Barajas, [facility] Triage/Med Refill 7/31/2019     Requested Prescriptions   Pending Prescriptions Disp Refills     atenolol (TENORMIN) 25 MG tablet [Pharmacy Med Name: ATENOLOL 25 MG TABLET] 90 tablet 3     Sig: TAKE 1 TABLET BY MOUTH EVERY DAY       Beta-Blockers Refill Protocol Passed - 7/31/2019  1:00 AM        Passed - PCP or prescribing provider visit in past 12 months or next 3 months     Last office visit with prescriber/PCP: 6/17/2019 Pablito Bobby MD OR same dept: 6/17/2019 Pablito Bobby MD OR same specialty: 6/17/2019 Pablito Bobby MD  Last physical: Visit date not found Last MTM visit: Visit date not found   Next visit within 3 mo: Visit date not found  Next physical within 3 mo: Visit date not found  Prescriber OR PCP: Pablito Walker MD  Last diagnosis associated with med order: 1. Essential hypertension  - atenolol (TENORMIN) 25 MG tablet [Pharmacy Med Name: ATENOLOL 25 MG TABLET]; TAKE 1 TABLET BY MOUTH EVERY DAY  Dispense: 90 tablet; Refill: 3    2. Anxiety  - escitalopram oxalate (LEXAPRO) 10 MG tablet [Pharmacy Med Name: ESCITALOPRAM 10 MG TABLET]; TAKE 1 TABLET BY MOUTH EVERY DAY  Dispense: 90 tablet; Refill: 3    3. Mild major depression (H)  - escitalopram oxalate (LEXAPRO) 10 MG tablet [Pharmacy Med Name: ESCITALOPRAM 10 MG TABLET]; TAKE 1 TABLET BY MOUTH EVERY DAY  Dispense: 90 tablet; Refill: 3    If protocol passes may refill for 12 months if within 3 months of last provider visit (or a total of 15 months).             Passed - Blood pressure filed in past 12 months     BP Readings from Last 1 Encounters:   06/17/19 112/78             escitalopram oxalate (LEXAPRO) 10 MG tablet [Pharmacy Med Name: ESCITALOPRAM 10 MG TABLET] 90 tablet 3     Sig: TAKE 1 TABLET BY MOUTH EVERY DAY       SSRI Refill Protocol  Passed -  7/31/2019  1:00 AM        Passed - PCP or prescribing provider visit in last year     Last office visit with prescriber/PCP: 6/17/2019 Pablito Bobby MD OR same dept: 6/17/2019 Pablito Bobby MD OR same specialty: 6/17/2019 Pablito Bobby MD  Last physical: Visit date not found Last MTM visit: Visit date not found   Next visit within 3 mo: Visit date not found  Next physical within 3 mo: Visit date not found  Prescriber OR PCP: Pablito Walker MD  Last diagnosis associated with med order: 1. Essential hypertension  - atenolol (TENORMIN) 25 MG tablet [Pharmacy Med Name: ATENOLOL 25 MG TABLET]; TAKE 1 TABLET BY MOUTH EVERY DAY  Dispense: 90 tablet; Refill: 3    2. Anxiety  - escitalopram oxalate (LEXAPRO) 10 MG tablet [Pharmacy Med Name: ESCITALOPRAM 10 MG TABLET]; TAKE 1 TABLET BY MOUTH EVERY DAY  Dispense: 90 tablet; Refill: 3    3. Mild major depression (H)  - escitalopram oxalate (LEXAPRO) 10 MG tablet [Pharmacy Med Name: ESCITALOPRAM 10 MG TABLET]; TAKE 1 TABLET BY MOUTH EVERY DAY  Dispense: 90 tablet; Refill: 3    If protocol passes may refill for 12 months if within 3 months of last provider visit (or a total of 15 months).

## 2021-05-31 NOTE — TELEPHONE ENCOUNTER
Called Gualberto that her medication was refilled.  Gualberto is also wondering about adding the  valerin supplement in addition to melatonin which I already take? Please advise. Malina Irving LPN

## 2021-06-01 ENCOUNTER — RECORDS - HEALTHEAST (OUTPATIENT)
Dept: ADMINISTRATIVE | Facility: CLINIC | Age: 56
End: 2021-06-01

## 2021-06-01 VITALS — BODY MASS INDEX: 29.82 KG/M2 | WEIGHT: 197.56 LBS

## 2021-06-01 NOTE — TELEPHONE ENCOUNTER
Refill Approved    Rx renewed per Medication Renewal Policy. Medication was last renewed on 8/26/19.    Ele Zimmerman, [facility] Triage/Med Refill 9/22/2019     Requested Prescriptions   Pending Prescriptions Disp Refills     doxepin (SINEQUAN) 10 MG capsule [Pharmacy Med Name: DOXEPIN 10 MG CAPSULE] 30 capsule 0     Sig: TAKE 1 CAPSULE (10 MG TOTAL) BY MOUTH AT BEDTIME.       Tricyclics/Misc Antidepressant/Antianxiety Meds Refill Protocol Passed - 9/22/2019 12:25 AM        Passed - PCP or prescribing provider visit in last year     Last office visit with prescriber/PCP: 6/17/2019 Pablito Bobby MD OR same dept: 6/17/2019 Pablito Bobby MD OR same specialty: 6/17/2019 Pablito Bobby MD  Last physical: Visit date not found Last MTM visit: Visit date not found   Next visit within 3 mo: Visit date not found  Next physical within 3 mo: Visit date not found  Prescriber OR PCP: Pabilto Walker MD  Last diagnosis associated with med order: 1. Primary insomnia  - doxepin (SINEQUAN) 10 MG capsule [Pharmacy Med Name: DOXEPIN 10 MG CAPSULE]; Take 1 capsule (10 mg total) by mouth at bedtime.  Dispense: 30 capsule; Refill: 0    If protocol passes may refill for 12 months if within 3 months of last provider visit (or a total of 15 months).

## 2021-06-02 VITALS — WEIGHT: 201.06 LBS | BODY MASS INDEX: 30.35 KG/M2

## 2021-06-03 VITALS — BODY MASS INDEX: 30.55 KG/M2 | WEIGHT: 202.38 LBS

## 2021-06-04 VITALS
DIASTOLIC BLOOD PRESSURE: 78 MMHG | HEIGHT: 69 IN | BODY MASS INDEX: 31.9 KG/M2 | WEIGHT: 215.4 LBS | HEART RATE: 60 BPM | SYSTOLIC BLOOD PRESSURE: 104 MMHG

## 2021-06-04 VITALS
TEMPERATURE: 98.2 F | DIASTOLIC BLOOD PRESSURE: 70 MMHG | RESPIRATION RATE: 16 BRPM | OXYGEN SATURATION: 97 % | SYSTOLIC BLOOD PRESSURE: 106 MMHG | BODY MASS INDEX: 32.45 KG/M2 | WEIGHT: 215 LBS | HEART RATE: 60 BPM

## 2021-06-06 NOTE — PATIENT INSTRUCTIONS - HE
Your symptoms are most likely due to acute bronchitis.  This is inflammation of the tubes leading into the lungs, most often due to a viral infection and an antibiotic will not help this.    I have prescribed an albuterol inhaler to help with the cough/wheezing.    May take Tessalon Perles as needed for cough.  May also try to use Mucinex or Robitussin.    Please monitor symptoms carefully.  If developing chest pain, shortness of breath, fever, coughing up blood, extreme fatigue, or any other new, concerning symptoms, come back to clinic or go to ER immediately.  Otherwise, if no improvement in symptoms in one week, follow-up with your primary care provider.

## 2021-06-06 NOTE — PROGRESS NOTES
Chief Complaint   Patient presents with     Cough     since last week        HPI:  Gualberto Saunders is a 54 y.o. female who presents today complaining of a strong cough since last week. She denies fever. She is taking Robitussin, Flovent/albuterol inhaler is . She is using the albuterol inhaler four times a day. She usually has a similar episodes once a year. She is leaving Saturday for a trip and wants this 'under control.' She states she has viral-induced asthma and has had pneumonia a few times in her life.     History obtained from the patient.    Problem List:  2018: Mild major depression (H)  2017: Anxiety  2015-10: S/P hysterectomy  2015: Subclinical hypothyroidism  2015: Hypertension  2015: Perimenopause  2014: Vitiligo  Basal Cell Carcinoma Of The Skin  Cystocele  Esophageal Reflux  Metatarsal Spur Of The Left Foot  Pneumonia      Past Medical History:   Diagnosis Date     Depression      Elevated hemoglobin (H)      Hypertension      PONV (postoperative nausea and vomiting)      Uterine fibroid        Social History     Tobacco Use     Smoking status: Never Smoker     Smokeless tobacco: Never Used   Substance Use Topics     Alcohol use: Yes     Alcohol/week: 0.8 standard drinks     Types: 1 drink(s) per week       Review of Systems   Constitutional: Positive for fatigue. Negative for activity change, appetite change, chills and fever.   HENT: Positive for sinus pain. Negative for ear discharge, ear pain and sore throat.    Respiratory: Positive for cough and chest tightness. Negative for shortness of breath and wheezing.    Cardiovascular: Negative.    Gastrointestinal: Negative.    Genitourinary: Negative.    Musculoskeletal: Negative.    Skin: Negative.    Neurological: Negative.        Vitals:    20 1305   BP: 106/70   Patient Site: Right Arm   Patient Position: Sitting   Cuff Size: Adult Large   Pulse: 60   Resp: 16   Temp: 98.2  F (36.8  C)   TempSrc: Oral   SpO2: 97%    Weight: 215 lb (97.5 kg)       Physical Exam  Constitutional:       Appearance: Normal appearance.   HENT:      Mouth/Throat:      Mouth: Mucous membranes are moist.   Neck:      Musculoskeletal: Normal range of motion.   Cardiovascular:      Rate and Rhythm: Normal rate and regular rhythm.      Pulses: Normal pulses.      Heart sounds: Normal heart sounds.   Pulmonary:      Effort: Pulmonary effort is normal.      Breath sounds: Normal breath sounds.   Musculoskeletal: Normal range of motion.   Skin:     General: Skin is warm and dry.   Neurological:      Mental Status: She is alert.         Clinical Decision Making: Patient presenting with severe, dry cough for one week. Patient notes no relief with albuterol inhaler four times a day, robitussin, or conservative measures. Advised patient on medication management. Encouraged patient to return to medical facility if worsening symptoms.     At the end of the encounter, I discussed results, diagnosis, medications. Discussed red flags for immediate return to clinic/ER, as well as indications for follow up if no improvement. Patient understood and agreed to plan. Patient was stable for discharge.    1. Bronchitis with bronchospasm  benzonatate (TESSALON) 200 MG capsule    albuterol (PROAIR HFA;PROVENTIL HFA;VENTOLIN HFA) 90 mcg/actuation inhaler    predniSONE (DELTASONE) 20 MG tablet         Patient Instructions   Your symptoms are most likely due to acute bronchitis.  This is inflammation of the tubes leading into the lungs, most often due to a viral infection and an antibiotic will not help this.    I have prescribed an albuterol inhaler to help with the cough/wheezing.    May take Tessalon Perles as needed for cough.  May also try to use Mucinex or Robitussin.    Please monitor symptoms carefully.  If developing chest pain, shortness of breath, fever, coughing up blood, extreme fatigue, or any other new, concerning symptoms, come back to clinic or go to ER  immediately.  Otherwise, if no improvement in symptoms in one week, follow-up with your primary care provider.

## 2021-06-08 NOTE — PROGRESS NOTES
ASSESSMENT & PLAN:  1. Essential hypertension  - atenolol (TENORMIN) 25 MG tablet; Take 1 tablet (25 mg total) by mouth every evening.  Dispense: 90 tablet; Refill: 1  - Lipid Cascade; Future    2. Depression  - escitalopram oxalate (LEXAPRO) 10 MG tablet; TAKE ONE TABLET BY MOUTH ONE TIME DAILY  Dispense: 90 tablet; Refill: 1    3. Anxiety    4. Subclinical hypothyroidism  - Thyroid Stimulating Hormone (TSH); Future  - T4, Free; Future  - T3, Total; Future       She is not fasting.  We will send for future lab orders.  Continue to monitor blood pressure.  Low salt diet, healthy food choices.  Regular physical activity as tolerated.  She has had regular follow-up with dermatology for her skin cancer.  We'll try to obtain records from [facility].  Recheck in 6 months, earlier if symptoms worse.  She was agreeable with the plans.     Orders Placed This Encounter   Procedures     Thyroid Stimulating Hormone (TSH)     Standing Status:   Future     Standing Expiration Date:   1/20/2018     T4, Free     Standing Status:   Future     Standing Expiration Date:   1/20/2018     T3, Total     Standing Status:   Future     Standing Expiration Date:   1/20/2018     Lipid Cascade     Standing Status:   Future     Standing Expiration Date:   1/20/2018     Order Specific Question:   Fasting is required?     Answer:   Yes     Medications Discontinued During This Encounter   Medication Reason     atenolol (TENORMIN) 25 MG tablet Reorder     escitalopram oxalate (LEXAPRO) 10 MG tablet Reorder          CHIEF COMPLAINT:  Chief Complaint   Patient presents with     Establish Care     Medication Refill        HISTORY OF PRESENT ILLNESS:  Gualberto is a 51 y.o. female presenting to the clinic today to establish care. She was previously a patient of Dr. Courtney Young whom she enjoyed having as a doctor. She uses [employer] and checks it when she is emailed.     Acne: She started taking spironolactone for hormonal comedones which has worked  well for her. Her dermatologist monitors her potassium biannually. She was last seen by dermatology in the fall of 2016.    Hx Basal Cell Skin Cancer: She has had a few basal cell skin cancers removed. She follows with Dr. Nelida Johnson annually for a skin check, but she sees her twice annually for potassium level monitoring.     Viral-Induced Asthma: She does not know of any triggers for her asthma symptoms other than viral illnesses.  She has not been hospitalized or seen in the ED for asthma-related breathing problems. She has had pneumonia a few times. She does not have wheezing or difficulty breathing outside of viral infections.     Anxiety: She took Lexapro years ago, and then she discontinued it for several years. She has been taking Lexapro 10 mg again for about one year now. She has a daughter with severe OCD and anxiety; her daughters compulsions and anxieties trigger her own anxieties. She denies any constant worries, negative thoughts, or thought of ending life or self-harm. She has mood swings which she notes are because she is in menopause. PHQ9 score of 2, GAD7 of 2.    Insomnia: She notes she cannot sleep. She started taking melatonin a while ago and she has been taking Unisom recently. She rarely drinks caffeine. She cannot take benadryl-type medications because it makes her mind race. She has not taken anything else for sleep. She thinks her insomnia might be age-related.    Left Shoulder Separation: She fell on the ice 2.5 weeks ago and she  her left shoulder. It is improving and she will be following up with Dr. Grullon at [facility] next week.     Hypothyroidism: She would like to have her thyroid checked again for fatigue and because her thyroid levels were borderline in the past. Her fatigue is intermittent. Her TSH was 5.09, T4 was 0.7, and T3 was 2.6 on 10/14/2015. She has gained weight and denies any sudden weight loss. She is wondering if fatigue is normal with age or a change in  hormones as she is perimenopausal.     Hypertension: She checks her blood pressure at work a couple times per month. Her blood pressure averages around 120/70. She denies any side effects from her medications. She has been tolerating atenolol 25 mg daily.     Hx Uterine Fibroids: She had a hysterectomy in 2015; her cervix was removed but she still has both ovaries. She notes her hemoglobin was 15-16 prior to her hysterectomy.     Health Maintenance: She smoked for a couple years in college, but only socially. She eats healthy at this time. She walks for exercise, more often when it is nicer outside. She typically walks for 45-60 minutes. She is not fasting today; she is willing to return at a separate time for fasting labs. She was having her annual exams done at [facility] prior to her hysterectomy so she might have had labs done there. She knows she has had a cholesterol panel done about 3 years ago.    REVIEW OF SYSTEMS:   She denies any fever, chills, cough, URI's, hematuria, hemoptysis, hematochezia, dizziness, LOC, chest pain, heart palpitations, SOB, abdominal pain, pelvic pain, dysuria, dysphagia, or choking spells. She denies any personal history of heart disease, seizures, diabetes, thyroid disease, or lung disease other than intermittent asthma. All other systems are negative.     PFSH:  She works at [employer]. 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 She had a hysterectomy due to menorrhagia from fibroids. Her cervix was removed due to abnormal Pap smears, but she still has both her ovaries. Her father has been diagnosed with idiopathic pulmonary fibrosis. Her paternal grandmother  in her 30's of breast cancer. Her mother  of ARDS; she had an exploratory laparotomy which revealed an absent pancreas, and so it was thought she had an autoimmune pancreatitis, she ended up on a ventilator and developed ARDS and passed away. Her mother had a fungal infection  when she was young.    History   Smoking Status     Never Smoker   Smokeless Tobacco     Never Used        Family History   Problem Relation Age of Onset     Cancer Father      thyroid     Prostate cancer Father      Idiopathic pulmonary fibrosis Father      No Medical Problems Brother      Other Mother      ARDS (acute respiratory distress syndrome)     Pancreatitis Mother      exploratory laparotomy revealed absence of pancreas, possibly autoimmune     Breast cancer Paternal Grandmother 35        Social History     Social History     Marital status:      Spouse name: N/A     Number of children: N/A     Years of education: N/A     Occupational History     nurse [employer]     Social History Main Topics     Smoking status: Never Smoker     Smokeless tobacco: Never Used     Alcohol use 0.5 oz/week     1 drink(s) per week     Drug use: Not on file     Sexual activity: Yes     Partners: Male     Other Topics Concern     Not on file     Social History Narrative        Past Surgical History   Procedure Laterality Date     Pr remove tonsils/adenoids,<14 y/o       Description: Tonsillectomy With Adenoidectomy;  Recorded: 03/23/2009;     Pr ligate fallopian tube       Description: Tubal Ligation;  Recorded: 03/23/2009;     Pr lap,diagnostic abdomen       Description: Laparoscopy (Diagnostic);  Recorded: 09/03/2009;  Comments: for infertility     Pr reduction of large breast       Description: Breast Surgery Reduction Procedure Elective;  Proc Date: 08/01/2009;     [place] tooth extraction       Ablation saphenous vein w/ rfa       Pr vaginal hysterectomy,uterus 250 gms/< N/A 10/20/2015     Procedure: HYSTERECTOMY VAGINAL;  Surgeon: Dominique Ngo MD;  Location: [facility];  Service: Gynecology        Allergies   Allergen Reactions     Vicryl Sutures Swelling     Diphenhydramine Anxiety        Active Ambulatory Problems     Diagnosis Date Noted     Basal Cell Carcinoma Of The Skin      Depression  "     Cystocele      Esophageal Reflux      Metatarsal Spur Of The Left Foot      Vitiligo 12/02/2014     Hypertension 06/04/2015     Perimenopause 06/04/2015     Subclinical hypothyroidism 06/05/2015     S/P hysterectomy 10/20/2015     S/P vaginal hysterectomy 10/20/2015     Anxiety 01/20/2017     Resolved Ambulatory Problems     Diagnosis Date Noted     Pneumonia      Past Medical History   Diagnosis Date     Depression      Elevated hemoglobin      PONV (postoperative nausea and vomiting)      Uterine fibroid         VITALS:  Vitals:    01/20/17 1556   BP: 130/70   Patient Site: Right Arm   Patient Position: Sitting   Cuff Size: Adult Large   Pulse: (!) 57   SpO2: 97%   Weight: 208 lb 8 oz (94.6 kg)   Height: 5' 8.25\" (1.734 m)     Wt Readings from Last 3 Encounters:   01/20/17 208 lb 8 oz (94.6 kg)   09/26/16 208 lb 1.6 oz (94.4 kg)   04/04/16 205 lb (93 kg)     Body mass index is 31.47 kg/(m^2).     PHYSICAL EXAM:  Visit Vitals     /70 (Patient Site: Right Arm, Patient Position: Sitting, Cuff Size: Adult Large)     Pulse (!) 57     Ht 5' 8.25\" (1.734 m)     Wt 208 lb 8 oz (94.6 kg)     LMP 10/16/2015     SpO2 97%     Breastfeeding No     BMI 31.47 kg/m2       Vital signs noted above. AAO ×3.  HEENT no nasal discharge, moist oral mucosa.  Neck: Supple neck, nonpalpable cervical lymph nodes. Lungs: Clear to auscultation bilateral.  Heart: S1-S2 regular rate and rhythm, no murmurs were noted.  Abdomen: Flabby, soft with bowel sounds and nontender.  Extremities: No edema, pulses were full and equal.    QUALITY MEASURES:  The following high BMI interventions were performed this visit: encouragement to exercise and nutrition therapy     DATA REVIEWED:  ADDITIONAL HISTORY SUMMARIZED (2): Reviewed Dr. Young's last physical note 10/14/2015.   DECISION TO OBTAIN EXTRA INFORMATION (1): GUSTABO for [facility].   RADIOLOGY TESTS (1): None.  LABS (1): Reviewed and ordered labs.  MEDICINE TESTS (1): None.  INDEPENDENT " REVIEW (2 each): None.      The visit lasted a total of 22 minutes face to face with the patient. Over 50% of the time was spent counseling and educating the patient about her health history and chronic health conditions.     IJuliane, am scribing for and in the presence of Dr. Knox.  I, Dr. Knox, personally performed the services described in this documentation, as scribed by Juliane Jurado in my presence, and it is both accurate and complete.     MEDICATIONS:  Current Outpatient Prescriptions   Medication Sig Dispense Refill     atenolol (TENORMIN) 25 MG tablet Take 1 tablet (25 mg total) by mouth every evening. 90 tablet 1     escitalopram oxalate (LEXAPRO) 10 MG tablet TAKE ONE TABLET BY MOUTH ONE TIME DAILY 90 tablet 1     lansoprazole (PREVACID) 30 MG capsule Take 30 mg by mouth daily.       spironolactone (ALDACTONE) 100 MG tablet        albuterol (PROVENTIL HFA;VENTOLIN HFA) 90 mcg/actuation inhaler Inhale 2 puffs every 6 (six) hours as needed for wheezing. 18 g 11     cetirizine (ZYRTEC) 10 MG tablet Take 10 mg by mouth as needed.        codeine-guaiFENesin (GUAIFENESIN AC)  mg/5 mL liquid Take 5 mL by mouth 2 (two) times a day as needed for cough. 240 mL 0     fluticasone (FLOVENT HFA) 220 mcg/actuation inhaler Inhale 2 puffs 2 (two) times a day. 1 Inhaler 2     LACTOBAC CMB #3/FOS/PANTETHINE (PROBIOTIC & ACIDOPHILUS ORAL) Take 1 capsule by mouth.       OMEPRAZOLE ORAL Take by mouth.       predniSONE (DELTASONE) 20 MG tablet 40mg daily for 5 days then 20mg daily for 5 days 15 tablet 0     No current facility-administered medications for this visit.         Total data points: 4

## 2021-06-08 NOTE — TELEPHONE ENCOUNTER
Reached out to patient and left a message to return phone call. Please see the below message and assist with scheduling. Thank you,   Nancy Mcmillan

## 2021-06-08 NOTE — TELEPHONE ENCOUNTER
Last office visit for insomnia 6-17-19. Follow up as needed. Last dispensed 9-22-19. Please advise.

## 2021-06-09 ENCOUNTER — RECORDS - HEALTHEAST (OUTPATIENT)
Dept: ADMINISTRATIVE | Facility: CLINIC | Age: 56
End: 2021-06-09

## 2021-06-09 NOTE — TELEPHONE ENCOUNTER
Reached out to patient and assisted with scheduling. Patient is requesting lab orders be placed prior to her Physical so they are available at the time of the appointment. Please advise. Thank you,  Nancy Mcmillan

## 2021-06-09 NOTE — PATIENT INSTRUCTIONS - HE
"There is a new shingles vaccine that is 97% effective. It is the Shingrix vaccine and is recommended for those 50 and older. We recommend the vaccine even if you have had shingles or the older vaccine against shingles- Zostavax. Insurance coverage for the vaccine varies. I recommend you check with your insurance to verify if, when and where it is covered. The vaccine is covered by most commercial insurance but Medicare does not cover the vaccine. It may be covered by Medicare Part D (your drug/pharmacy plan) and sometimes it is covered only at a pharmacy instead of here in the clinic. If it is covered in the clinic, you may schedule a nurse visit anytime to get the first dose of the vaccine. The second dose is recommended two months after the first and should be gotten by 6 months after the first.   About 10% of people will get a sore, red reaction at the site of the injection. Some people may also feel a little sick or nauseated after the injection. This may happen with either the first and/or second vaccine.        Weight Loss Strategies for Success: (start small, pick 1 or 2 goals each week)  1. Check out the [employer] website to learn about BODPOD and other options for fat/muscle assessments, cooking classes, fitness classes (pilates, yoga, kettleball, etc), , massage  and gym memberships.  (https://www.[employer]org/services/ways-to-wellness)  2. Have a  \"Table, chair, plate\" with every meal. Sit down to eat your food!  3. Brush your teeth after the last meal of the day. Prevents evening snacking.   4. Avoid sugary beverages. (pop, fancy coffees). Reduce alcohol.   5. Drink water instead.   6. No fast food (or reduce meals out to a specific #/week). Eat at home 90% of the time.  7. Keep a food log- apps like [employer] are very helpful for this.  8. Start the morning with breakfast. Every single day.   9. High protein (60g/day): Try to get protein in at least every 3.5 hours during the day to " "avoid a hunger surge late in the day.  10. If hungry, start with a protein serving, followed by water and then a crunchy vegetable that requires chewing (this decreases the hunger hormone).   11. Use plenty of healthy fats (olive oil, avocados, nuts) when cooking which will make you feel more satisfied.    12. Be intentional and think about what you are eating and feel the food fuel you.  13. Choose one day to be \"meal planning\" and/or \"meal prep\" days. Plan ahead for grocery shopping for healthy food choices, and spend 30min-1hr each week prepping your fruits/veggies (wash, chop, store in easy grab portions).  14. Make it easy to grab the protein (cut up chicken breasts, greek yogurt containers, hard boiled eggs, etc)  15. Start low intensity exercise 30 minutes/day (walking/hiking/yard work).  16. Goal for 10,000 steps per day (consider a FitBit or other tracking device).  17. Don't step on the scale, but if you must: Measure inches, not weight!      Blue Zones, Living to 100!    The Blue Zones Solution by Sohan Etienne  The Blue Zones Cookbook    In his book, Jaimie identified the \"Power Nine\"- 9 concepts that are important in the lifestyles of blue zones people:     1. Move naturally- walk, bike, garden, etc  2. Life Purpose- \"why I wake up in the morning\"  3. Downshift- stress reduction through yoga, prayer, happy hour, meditation etc  4. 80% rule- stop eating when stomach is 80% full  5. Plant Slant- plant based diet, especially beans.  6. Wine @ 5- Moderate alcohol intake, especially wine  7. Right [place]- social connections with people engaged in healthy behaviors   8. Community- engagement in spirituality or Hindu  9. Loved Ones First- committment to family    "

## 2021-06-09 NOTE — PROGRESS NOTES
"Gualberto Saunders is a 55 y.o. female who is being evaluated via a billable video visit.      The patient has been notified of following:     \"This video visit will be conducted via a call between you and your physician/provider. We have found that certain health care needs can be provided without the need for an in-person physical exam.  This service lets us provide the care you need with a video conversation.  If a prescription is necessary we can send it directly to your pharmacy.  If lab work is needed we can place an order for that and you can then stop by our lab to have the test done at a later time.    Video visits are billed at different rates depending on your insurance coverage. Please reach out to your insurance provider with any questions.    If during the course of the call the physician/provider feels a video visit is not appropriate, you will not be charged for this service.\"    Patient has given verbal consent to a Video visit? Yes  How would you like to obtain your AVS? AVS Preference: [email or web address].  If dropped by the video visit, the video invitation should be sent to: Text to cell phone: [phone]  Will anyone else be joining your video visit? No      "

## 2021-06-10 ENCOUNTER — COMMUNICATION - HEALTHEAST (OUTPATIENT)
Dept: FAMILY MEDICINE | Facility: CLINIC | Age: 56
End: 2021-06-10

## 2021-06-10 DIAGNOSIS — I10 ESSENTIAL HYPERTENSION: ICD-10-CM

## 2021-06-11 RX ORDER — ATENOLOL 25 MG/1
TABLET ORAL
Qty: 90 TABLET | Refills: 0 | Status: SHIPPED | OUTPATIENT
Start: 2021-06-11 | End: 2021-09-07

## 2021-06-11 NOTE — TELEPHONE ENCOUNTER
Refill Approved    Rx renewed per Medication Renewal Policy. Medication was last renewed on 7/31/19, last OV 7/20/20.    Roxane Glasgow, [facility] Triage/Med Refill 9/13/2020     Requested Prescriptions   Pending Prescriptions Disp Refills     atenoloL (TENORMIN) 25 MG tablet 90 tablet 3     Sig: Take 1 tablet (25 mg total) by mouth daily.       Beta-Blockers Refill Protocol Passed - 9/11/2020  3:43 PM        Passed - PCP or prescribing provider visit in past 12 months or next 3 months     Last office visit with prescriber/PCP: Visit date not found OR same dept: Visit date not found OR same specialty: 6/17/2019 Pablito Bobby MD  Last physical: 7/14/2020 Last MTM visit: Visit date not found   Next visit within 3 mo: Visit date not found  Next physical within 3 mo: Visit date not found  Prescriber OR PCP: Mary Winn MD  Last diagnosis associated with med order: 1. Essential hypertension  - atenoloL (TENORMIN) 25 MG tablet; Take 1 tablet (25 mg total) by mouth daily.  Dispense: 90 tablet; Refill: 3    If protocol passes may refill for 12 months if within 3 months of last provider visit (or a total of 15 months).             Passed - Blood pressure filed in past 12 months     BP Readings from Last 1 Encounters:   07/14/20 104/78

## 2021-06-12 NOTE — PROGRESS NOTES
Assessment:     1. Upper back pain on left side  Ambulatory referral to Physical Therapy     Consistent with muscular pain. Unlikely significant injury given normal ROM and strength.     Plan:   Left upper back pain  -Rest, activity as tolerated according to pain  -Apply ice or heat 2- 3 times daily for 15 -20 min  -May take Ibuprofen 800 mg three times daily with food for 5-7 days and then as needed for pain. Do not exceed 2400mg daily. May alternate with Tylenol as needed for pain.  -Referral for physical therapy placed  -Follow up with primary care in if symptoms are not improving for re-evaluation        Subjective:       Gualberto Saunders is a 52 y.o. female who presents for evaluation of left upper back.  This started 9 days ago and gradually came on after she had been helping her dgt pack up and move. She denies any particular injury or heavy lifting with the moving that triggered the pain. Six days ago saw [facility] and was given medrol dose and tizandine without relief. Has been applying Heat which is most helpful. She Has constant pain that is mild but with any left arm movement its sharp pain that is very localized on the lower border of the scapula. She has also taking Ibuprofen 800mg QID and Naprosyn with minimal relief. She denies any rashes, chest pain, SOB, dyspnea, abd pain, n/v, cough.     She  her shoulder in Jan 2017. She did not have PT or surgery at that time. Resolved spontaneously.       Review of Systems  Pertinent items are noted in HPI.      Objective:      /82  Pulse 78  Temp 98.1  F (36.7  C) (Oral)   Resp 16  Wt 198 lb (89.8 kg)  LMP 10/16/2015  SpO2 96%  Breastfeeding? No  BMI 29.89 kg/m2  General appearance: alert, appears stated age and cooperative  Lungs: clear to auscultation bilaterally  Heart: regular rate and rhythm, S1, S2 normal, no murmur, click, rub or gallop  Skin: Skin color, texture, turgor normal. No rashes or lesions  Neurologic: Alert and  oriented X 3, normal strength and tone. Normal symmetric reflexes. Normal coordination and gait. grossly normal sensation and coordination.   Musc: TTP of left lower medial scapula border. Pain elicited with movement, particularly flexion and adduction of the shoulder. Full ROM of BUE and neck. Strength 5/5 in BUE

## 2021-06-12 NOTE — PROGRESS NOTES
[facility] Discharge Summary  Patient Name: Gualberto Saunders  Date: 9/29/2017  Referral Diagnosis: (L) upper back pain  Referring provider: Jennifer Marcial C*  Visit Diagnosis:   1. Acute pain of left shoulder     2. Shoulder stiffness, left     3. Hypertension     4. Depression     5. Anxiety         Goals:  Pt. will demonstrate/verbalize independence in self-management of condition in : 6 weeks  Pt. will be independent with home exercise program in : 6 weeks  Pt. will have improved quality of sleep: with less pain;in 6 weeks;Comment  Comment:: not waking due to pain  Patient will reach / maintain arm movement: forward;overhead;for dressing;with no pain;in 6 weeks  Pt will: be able to lift for household tasks and work without increased symptoms in 6 weeks.     Patient was seen for 1 visit on 8/29/17 with no missed appointments.  The patient discontinued therapy, did not return.  Patient did not return for follow up.     Therapy will be discontinued at this time.  The patient will need a new referral to resume.    Thank you for your referral.  Mabel Lo  9/29/2017  12:04 PM    [facility]   Cervical Thoracic Initial Evaluation    Patient Name: Gualberto Saunders  Date of evaluation: 8/29/2017  Visit #1  Referral Diagnosis: (L) upper back pain  Referring provider: Jennifer Marcial C*  Visit Diagnosis:     ICD-10-CM    1. Acute pain of left shoulder M25.512    2. Shoulder stiffness, left M25.612    3. Hypertension I10    4. Depression F32.9    5. Anxiety F41.9        Assessment:   Gualberto Saunders is a 52 y.o. female who presents to therapy today with chief complaints of (L) scapular pain. Onset date of sx was 1 1/2 weeks ago.  Functional impairments include reaching, using (L) arm, sleeping, lifting, dressing/personal cares.  Clinical findings include decreased (L) shoulder ROM, pain with shoulder strength testing, (-) CAROM testing, minimal tenderness of (L) shoulder/thorax.         Pt. is appropriate for skilled PT intervention as outlined in the Plan of Care (POC).  Pt. is a good candidate for skilled PT services to improve pain levels and function.    Goals:  Pt. will demonstrate/verbalize independence in self-management of condition in : 6 weeks  Pt. will be independent with home exercise program in : 6 weeks  Pt. will have improved quality of sleep: with less pain;in 6 weeks;Comment  Comment:: not waking due to pain  Patient will reach / maintain arm movement: forward;overhead;for dressing;with no pain;in 6 weeks  Pt will: be able to lift for household tasks and work without increased symptoms in 6 weeks.     Patient's expectations/goals are realistic.    Barriers to Learning or Achieving Goals:  No Barriers.       Plan / Patient Instructions:        Plan of Care:   Authorization / Certification Number of Visits:   Communication with: Referral Source  Patient Related Instruction: Nature of Condition;Treatment plan and rationale;Self Care instruction;Basis of treatment;Posture;Next steps  Times per Week: 1-2  Number of Visits: up to 12  Discharge Planning: HEP, self management  Precautions / Restrictions : none  Therapeutic Exercise: ROM;Stretching;Strengthening  Neuromuscular Reeducation: posture;kinesio tape  Manual Therapy: myofascial release;strain counterstrain    POC and pathology of condition were reviewed with patient.  Pt. is in agreement with the Plan of Care  A Home Exercise Program (HEP) was initiated today.    Plan for next visit: manual therapy, home exercise program.      Subjective:         Social information:   Living Situation:lives with others   Occupation:RN   Work Status:Working part time   Equipment Available: None    History of Present Illness:    Gualberto is a 52 y.o. female who presents to therapy today with complaints of (L) scapular pain. She denies neck pain or stiffness. She has a history of neck tightness related to stress. Date of onset/duration of  symptoms is 17. Woke with pain, progressively worsening. Onset was gradual. Symptoms are constant and getting worse. She denies history of similar symptoms. She describes their previous level of function as not limited  Went to [facility], took medrol dose pack. Didn't seem to help.  Hx of (L) AC jt separation last winter after falling on the ice.     Pain Ratin  Pain rating at best: 3  Pain rating at worst: 6  Pain description: pain    Functional limitations are described as occurring with:   using arm  personal cares donning shirt or jacket, combing hair and washing hair  reaching at shoulder height, overhead and to the side  pushing/pulling   Sleep disruption due to pain  Lying on (L) side    Patient reports benefit from:  heat, upright posture  no benefit from  dose pack         Objective:      Note: Items left blank indicates the item was not performed or not indicated at the time of the evaluation.    Patient Outcome Measures :    Neck Disability Score in %: 24   Scores range from 0-100%, where a score of 0% represents minimal pain and maximal function. The minmal clinically important difference is a score reduction of 10%.    Cervical Thoracic Examination  1. Acute pain of left shoulder     2. Shoulder stiffness, left     3. Hypertension     4. Depression     5. Anxiety       Precautions/Restrictions: None  Involved side: Left  Posture Observation:      General standing posture is fair.  Cervical:  Moderate forward head  Shoulder/Thoracic complex: Moderate bilateral scapular protraction   Moderate bilateral scapular winging  Moderately increased CT junction thoracic kyphosis  Moderately increased upper thoracic kyphosis   scoliosis    Cervical ROM:   % of normal/ no sx reproduction with CAROM  Date: 17     *Indicate scale AROM AROM AROM   Cervical Flexion WNL     Cervical Extension WNL      Right Left Right Left Right Left   Cervical Sidebending WFL WFL       Cervical Rotation WFL WFL        Cervical Protraction      Cervical Retraction      Thoracic Flexion      Thoracic Extension      Thoracic Sidebending         Thoracic Rotation           Shoulder/Elbow ROM  Date: 8/29/17     Shoulder and Elbow ROM ( )   AROM in degrees AROM in degrees AROM in degrees    Right Left Right Left Right Left   Shoulder Flexion (0-180 )  115 (+)       Shoulder Abduction (0-180 )  92 (+)       Shoulder Extension (0-60 )         Shoulder ER (0-90 )         Shoulder IR (0-70 )         Shoulder IR/EXT  T8       Elbow Flexion (150 )         Elbow Extension (0 )          PROM in degrees PROM in degrees PROM in degrees    Right Left Right Left Right Left   Shoulder Flexion (0-180 )  115 (+)       Shoulder Abduction (0-180 )  98 (+)       Shoulder Extension (0-60 )         Shoulder ER (0-90 )  50 (+)       Shoulder IR (0-70 )  78 (+)       Elbow Flexion (150 )         Elbow Extension (0 )           Shoulder/Elbow Strength  Date: 8/28/17     Shoulder/Elbow Strength (/5)  Manual Muscle Test (MMT) MMT MMT MMT    Right Left Right Left Right Left   Shoulder Flexion  4 (+)       Supraspinatus  4 (+)       Shoulder Abduction  5-       Shoulder Extension         Shoulder External Rotation  5-       Shoulder Internal Rotation  5- (+)       Elbow Flexion         Elbow Extension         Other:         Other:             Flexibility:    Palpation: No tenderness of (L) rotator cuff, subscapularis, rhomboids, (L) thoracic spine/rib tubercles.  Mild tenderness of (L) medial scapular border.     Treatment Today     TREATMENT MINUTES COMMENTS   Evaluation 35    Self-care/ Home management     Manual therapy 5 MFR - medial scap border, subscapularis   Neuromuscular Re-education     Therapeutic Activity     Therapeutic Exercises 15 Discussed findings, plan of care, anatomy, instructed in exercises. Discussed referred pain vs. Local soft tissue dysfunction   Gait training     Modality__________________                Total 55    Blank areas are  intentional and mean the treatment did not include these items.     PT Evaluation Code: (Please list factors)  Patient History/Comorbidities: 2  Examination: 3  Clinical Presentation: evolving  Clinical Decision Making: mod    Patient History/  Comorbidities Examination  (body structures and functions, activity limitations, and/or participation restrictions) Clinical Presentation Clinical Decision Making (Complexity)   No documented Comorbidities or personal factors 1-2 Elements Stable and/or uncomplicated Low   1-2 documented comorbidities or personal factor 3 Elements Evolving clinical presentation with changing characteristics Moderate   3-4 documented comorbidities or personal factors 4 or more Unstable and unpredictable High                Mabel ONOFRE Wally  8/29/2017  3:04 PM

## 2021-06-15 NOTE — TELEPHONE ENCOUNTER
Refill Approved    Rx renewed per Medication Renewal Policy. Medication was last renewed on 6/15/20.    Sage Coyne, [facility] Triage/Med Refill 3/8/2021     Requested Prescriptions   Pending Prescriptions Disp Refills     doxepin (SINEQUAN) 10 MG capsule [Pharmacy Med Name: DOXEPIN 10 MG CAPSULE] 90 capsule 2     Sig: TAKE 1 CAPSULE (10 MG TOTAL) BY MOUTH AT BEDTIME       Tricyclics/Misc Antidepressant/Antianxiety Meds Refill Protocol Passed - 3/8/2021  7:21 AM        Passed - PCP or prescribing provider visit in last year     Last office visit with prescriber/PCP: Visit date not found OR same dept: Visit date not found OR same specialty: 6/17/2019 Pablito Bobby MD  Last physical: 7/14/2020 Last MTM visit: Visit date not found   Next visit within 3 mo: Visit date not found  Next physical within 3 mo: Visit date not found  Prescriber OR PCP: Mary Winn MD  Last diagnosis associated with med order: 1. Primary insomnia  - doxepin (SINEQUAN) 10 MG capsule [Pharmacy Med Name: DOXEPIN 10 MG CAPSULE]; TAKE 1 CAPSULE (10 MG TOTAL) BY MOUTH AT BEDTIME.  Dispense: 90 capsule; Refill: 2    If protocol passes may refill for 12 months if within 3 months of last provider visit (or a total of 15 months).

## 2021-06-16 PROBLEM — J45.20 MILD INTERMITTENT ASTHMA WITHOUT COMPLICATION: Status: ACTIVE | Noted: 2020-07-14

## 2021-06-16 PROBLEM — D12.5 BENIGN NEOPLASM OF SIGMOID COLON: Status: ACTIVE | Noted: 2017-10-23

## 2021-06-16 PROBLEM — K57.30 DIVERTICULAR DISEASE OF LARGE INTESTINE: Status: ACTIVE | Noted: 2017-10-23

## 2021-06-16 PROBLEM — Z83.719 FAMILY HISTORY OF POLYPS IN THE COLON: Status: ACTIVE | Noted: 2020-07-14

## 2021-06-16 PROBLEM — K44.9 HIATAL HERNIA: Status: ACTIVE | Noted: 2017-10-23

## 2021-06-16 NOTE — TELEPHONE ENCOUNTER
Refill Approved    Rx renewed per Medication Renewal Policy. Medication was last renewed on 10/13/20.    aSge Coyne, [facility] Triage/Med Refill 4/12/2021     Requested Prescriptions   Pending Prescriptions Disp Refills     levothyroxine (SYNTHROID, LEVOTHROID) 50 MCG tablet [Pharmacy Med Name: LEVOTHYROXINE 50 MCG TABLET] 90 tablet 1     Sig: TAKE 1 TABLET BY MOUTH EVERY DAY       Thyroid Hormones Protocol Passed - 4/10/2021 12:16 AM        Passed - Provider visit in past 12 months or next 3 months     Last office visit with prescriber/PCP: Visit date not found OR same dept: Visit date not found OR same specialty: 6/17/2019 Pablito Bobby MD  Last physical: 7/14/2020 Last MTM visit: Visit date not found   Next visit within 3 mo: Visit date not found  Next physical within 3 mo: Visit date not found  Prescriber OR PCP: Mary Winn MD  Last diagnosis associated with med order: 1. Subclinical hypothyroidism  - levothyroxine (SYNTHROID, LEVOTHROID) 50 MCG tablet [Pharmacy Med Name: LEVOTHYROXINE 50 MCG TABLET]; TAKE 1 TABLET BY MOUTH EVERY DAY  Dispense: 90 tablet; Refill: 1    If protocol passes may refill for 12 months if within 3 months of last provider visit (or a total of 15 months).             Passed - TSH on file in past 12 months for patient age 12 & older     TSH   Date Value Ref Range Status   10/13/2020 5.80 (H) 0.30 - 5.00 uIU/mL Final

## 2021-06-19 NOTE — PROGRESS NOTES
ASSESSMENT/PLAN:    Essential hypertension  Good control on atenolol 25 mg.  No change in medication. will refill this for her.  -     atenolol (TENORMIN) 25 MG tablet; Take 1 tablet (25 mg total) by mouth daily.    Mild major depression (H), anxiety  Good control on Lexapro 10 mg.  No change in medication. will refill this for her.  -     escitalopram oxalate (LEXAPRO) 10 MG tablet; Take 1 tablet (10 mg total) by mouth daily.    Esophageal reflux  Stable on lansoprazole 30 mg.  We had a lengthy discussion regarding long-term use of proton pump inhibitor and monitoring of side effects    Health maintenance  Colonoscopy and mammogram are up-to-date.  Due to patient's polyp history, she gets colonoscopy every 3 years.    SUBJECTIVE:    Gualberto Saunders is a 53 y.o. female who came in today     Hypertension  The patient takes atenolol 25 mg.  Blood pressure is well controlled on this medication.  Denies any chest pain or shortness of breath.  She is stable on this medication.  Medication is due for a refill.    Anxiety/depression  The patient has a daughter with OCD and anxiety which has triggered her to develop some anxiety is as well.  She has been on Lexapro 10 mg for quite a while.  This has been helping with her anxiety and mood.  She still worries about different things on some days but other than this anxiety symptoms are under good control.  She has no difficulty and function based on her symptoms.  These are all based on her erin 7 score.  Other than sleep she does not have any concerns with depression..  PHQ9=2, GAD7=1.  No suicidal homicidal ideation    GERD  Patient takes lansoprazole 30 mg.  A year ago she had endoscopy and colonoscopy.  She has tried to take herself off of the lansoprazole to have worsening symptoms.  Triggers are carbonation drinks which she has already eliminated.    Cough induced asthma  The patient is on albuterol just bili as needed.  She has a history of cough induced asthma.   ACT=25    Review of Systems (except those mentioned above)  Constitutional: Negative.   HENT: Negative.   Eyes: Negative.   Respiratory: Negative.   Cardiovascular: Negative.   Gastrointestinal: Negative.   Endocrine: Negative.   Genitourinary: Negative.   Musculoskeletal: Negative.   Skin: Negative.   Allergic/Immunologic: Negative.   Neurological: Negative.   Hematological: Negative.   Psychiatric/Behavioral: Negative.     Patient Active Problem List    Diagnosis Date Noted     Mild major depression (H) 08/09/2018     Anxiety 01/20/2017     S/P hysterectomy 10/20/2015     Subclinical hypothyroidism 06/05/2015     Hypertension 06/04/2015     Perimenopause 06/04/2015     Vitiligo 12/02/2014     Basal Cell Carcinoma Of The Skin      Cystocele      Esophageal Reflux      Metatarsal Spur Of The Left Foot      Allergies   Allergen Reactions     Vicryl Sutures Swelling     Current Outpatient Prescriptions   Medication Sig Dispense Refill     atenolol (TENORMIN) 25 MG tablet Take 1 tablet (25 mg total) by mouth daily. 90 tablet 3     escitalopram oxalate (LEXAPRO) 10 MG tablet Take 1 tablet (10 mg total) by mouth daily. 90 tablet 3     lansoprazole (PREVACID) 30 MG capsule Take 30 mg by mouth daily.       albuterol (PROVENTIL HFA;VENTOLIN HFA) 90 mcg/actuation inhaler Inhale 2 puffs every 6 (six) hours as needed for wheezing. 18 g 11     No current facility-administered medications for this visit.      Past Medical History:   Diagnosis Date     Depression      Elevated hemoglobin (H)      Hypertension      PONV (postoperative nausea and vomiting)      Uterine fibroid      Past Surgical History:   Procedure Laterality Date     ABLATION SAPHENOUS VEIN W/ RFA       HYSTERECTOMY  2015     [place] LAP,DIAGNOSTIC ABDOMEN      Description: Laparoscopy (Diagnostic);  Recorded: 09/03/2009;  Comments: for infertility     MN LIGATE FALLOPIAN TUBE      Description: Tubal Ligation;  Recorded: 03/23/2009;     MN REDUCTION OF LARGE BREAST       Description: Breast Surgery Reduction Procedure Elective;  Proc Date: 08/01/2009;     TN REMOVE TONSILS/ADENOIDS,<13 Y/O      Description: Tonsillectomy With Adenoidectomy;  Recorded: 03/23/2009;     TN VAGINAL HYSTERECTOMY,UTERUS 250 GMS/< N/A 10/20/2015    Procedure: HYSTERECTOMY VAGINAL;  Surgeon: Dominique Ngo MD;  Location: [facility] [place];  Service: Gynecology     REDUCTION MAMMAPLASTY  2007     WISDOM TOOTH EXTRACTION       Social History     Social History     Marital status:      Spouse name: N/A     Number of children: N/A     Years of education: N/A     Occupational History     nurse [employer]     Social History Main Topics     Smoking status: Never Smoker     Smokeless tobacco: Never Used     Alcohol use 0.5 oz/week     1 drink(s) per week     Drug use: None     Sexual activity: Yes     Partners: Male     Other Topics Concern     None     Social History Narrative     Family History   Problem Relation Age of Onset     Cancer Father      thyroid     Prostate cancer Father      Idiopathic pulmonary fibrosis Father      No Medical Problems Brother      Other Mother      ARDS (acute respiratory distress syndrome)     Pancreatitis Mother      exploratory laparotomy revealed absence of pancreas, possibly autoimmune     Breast cancer Paternal Grandmother 35     Colon polyps Daughter      pre CA- 22         OBJECTIVE:    Vitals:    08/09/18 1541   BP: 120/88   Pulse: 68   Weight: 197 lb 9 oz (89.6 kg)     Body mass index is 29.82 kg/(m^2).    Physical Exam:  Constitutional: Patient was oriented to person, place, and time. Patient appeared well-developed and well-nourished. No distress.   Head: Normocephalic and atraumatic.   Right Ear: External ear normal.   Left Ear: External ear normal.   Nose: Nose normal.   Eyes: Conjunctivae and EOM were normal. Right eye exhibited no discharge. Left eye exhibited no discharge. No scleral icterus.   The patient has good eye contact.  No  psychomotor retardation or stereotypical behaviors.  Speech was regular rate, regular rhythm, adequate responses.  Mood was stable and affect was congruent mood.  No suicidal or homicidal intent.  No hallucination.        Results for orders placed or performed in visit on 01/27/17   Thyroid Stimulating Hormone (TSH)   Result Value Ref Range    TSH 3.19 0.30 - 5.00 uIU/mL   T4, Free   Result Value Ref Range    Free T4 0.7 0.7 - 1.8 ng/dL   T3, Total   Result Value Ref Range    T3, Total 92 45 - 175 ng/dL   Lipid Cascade   Result Value Ref Range    Cholesterol 215 (H) <=199 mg/dL    Triglycerides 209 (H) <=149 mg/dL    HDL Cholesterol 49 (L) >=50 mg/dL    LDL Calculated 124 <=129 mg/dL    Patient Fasting > 8hrs? Yes    Vitamin D, Total (25-Hydroxy)   Result Value Ref Range    Vitamin D, Total (25-Hydroxy) 37.2 30.0 - 80.0 ng/mL

## 2021-06-20 NOTE — LETTER
Letter by Mary Winn MD at      Author: Mary Winn MD Service: -- Author Type: --    Filed:  Encounter Date: 7/14/2020 Status: (Other)       My Asthma Action Plan     Name: Gualberto Saunders   YOB: 1965  Date: 7/14/2020   My doctor: Mary Winn MD   My clinic: [facility]        My Rescue Medicine:   Albuterol (Proair/Ventolin/Proventil HFA) 2-4 puffs EVERY 4 HOURS as needed. Use a spacer if recommended by your provider.   My Asthma Severity:   Intermittent/Exercise Induced  Know your asthma triggers: upper respiratory infections             GREEN ZONE   Good Control    I feel good    No cough or wheeze    Can work, sleep and play without asthma symptoms     Take your asthma control medicine every day.     1. If exercise triggers your asthma, take your rescue medication    15 minutes before exercise or sports, and    During exercise if you have asthma symptoms  2. Spacer to use with inhaler: If you have a spacer, make sure to use it with your inhaler             YELLOW ZONE Getting Worse  I have ANY of these:    I do not feel good    Cough or wheeze    Chest feels tight    Wake up at night 1. Keep taking your Green Zone medications  2. Start taking your rescue medicine:    every 20 minutes for up to 1 hour. Then every 4 hours for 24-48 hours.  3. If you stay in the Yellow Zone for more than 12-24 hours, contact your doctor.  4. If you do not return to the Green Zone in 12-24 hours or you get worse, start taking your oral steroid medicine if prescribed by your provider.           RED ZONE Medical Alert - Get Help  I have ANY of these:    I feel awful    Medicine is not helping    Breathing getting harder    Trouble walking or talking    Nose opens wide to breathe     1. Take your rescue medicine NOW  2. If your provider has prescribed an oral steroid medicine, start taking it NOW  3. Call your doctor NOW  4. If you are still in the Red Zone  after 20 minutes and you have not reached your doctor:    Take your rescue medicine again and    Call 911 or go to the emergency room right away    See your regular doctor within 2 weeks of an Emergency Room or Urgent Care visit for follow-up treatment.          Annual Reminders:  Meet with Asthma Educator,  Flu Shot in the Fall, consider Pneumonia Vaccination for patients with asthma (aged 19 and older).    Pharmacy:   [facility] [address] [place] [address] [address]  Phone: [phone] Fax: [phone]      Electronically signed by Mary Winn MD   Date: 07/14/20                      Asthma Triggers  How To Control Things That Make Your Asthma Worse    Triggers are things that make your asthma worse.  Look at the list below to help you find your triggers and what you can do about them.  You can help prevent asthma flare-ups by staying away from your triggers.      Trigger                                                          What you can do   Cigarette Smoke  Tobacco smoke can make asthma worse. Do not allow smoking in your home, car or around you.  Be sure no one smokes at a mohit day care or school.  If you smoke, ask your health care provider for ways to help you quit.  Ask family members to quit too.  Ask your health care provider for a referral to Quit Plan to help you quit smoking, or call 0-237-643-PLAN.     Colds, Flu, Bronchitis  These are common triggers of asthma. Wash your hands often.  Dont touch your eyes, nose or mouth.  Get a flu shot every year.     Dust Mites  These are tiny bugs that live in cloth or carpet. They are too small to see. Wash sheets and blankets in hot water every week.   Encase pillows and mattress in dust mite proof covers.  Avoid having carpet if you can. If you have carpet, vacuum weekly.   Use a dust mask and HEPA vacuum.   Pollen and Outdoor Mold  Some people are allergic to trees, grass, or weed pollen,  or molds. Try to keep your windows closed.  Limit time out doors when pollen count is high.   Ask you health care provider about taking medicine during allergy season.     Animal Dander  Some people are allergic to skin flakes, urine or saliva from pets with fur or feathers. Keep pets with fur or feathers out of your home.    If you cant keep the pet outdoors, then keep the pet out of your bedroom.  Keep the bedroom door closed.  Keep pets off cloth furniture and away from stuffed toys.     Mice, Rats, and Cockroaches  Some people are allergic to the waste from these pests.   Cover food and garbage.  Clean up spills and food crumbs.  Store grease in the refrigerator.   Keep food out of the bedroom.   Indoor Mold  This can be a trigger if your home has high moisture. Fix leaking faucets, pipes, or other sources of water.   Clean moldy surfaces.  Dehumidify basement if it is damp and smelly.   Smoke, Strong Odors, and Sprays  These can reduce air quality. Stay away from strong odors and sprays, such as perfume, powder, hair spray, paints, smoke incense, paint, cleaning products, candles and new carpet.   Exercise or Sports  Some people with asthma have this trigger. Be active!  Ask your doctor about taking medicine before sports or exercise to prevent symptoms.    Warm up for 5-10 minutes before and after sports or exercise.     Other Triggers of Asthma  Cold air:  Cover your nose and mouth with a scarf.  Sometimes laughing or crying can be a trigger.  Some medicines and food can trigger asthma.

## 2021-06-20 NOTE — LETTER
Letter by July Nails RN at      Author: July Nails RN Service: -- Author Type: --    Filed:  Encounter Date: 7/21/2020 Status: (Other)       7/21/2020        Gualberto Saunders  [address] [address]    COVID-19 Antibody Screen   Date Value Ref Range Status   07/14/2020 Negative  Final     Comment:     No COVID-19 antibodies detected.  Patients within 10 days of symptom onset for  COVID-19 may not produce sufficient levels of detectable antibodies.  Immunocompromised COVID-19 patients may take longer to develop antibodies.     COVID-19 IgG Titer   Date Value Ref Range Status   07/14/2020 Not Applicable  Final     Comment:     Qualitative screen for total antibodies to COVID-19 (SARS-CoV-2) with  semi-quantitative measurement of IgG COVID-19 antibodies by endpoint titer.  COVID-19 antibodies may be elevated due to a past or current infection.  Negative results do not rule out COVID-19 infection.  Results from antibody  testing should not be used as the sole basis to diagnose or exclude SARS-CoV-2  infection or to inform infection status.  COVID-19 PCR test should be ordered  if current infection is suspected.  False positive results may occur in rare  cases due to cross-reacting antibodies.  This test was developed and its performance characteristics determined by the  [facility] ([facility]),  which is regulated under CLIA as qualified to perform high-complexity testing.  This test has not been reviewed by the FDA.  Testing performed by [facility], [facility], [address], [address], [place] [address]     You have tested NEGATIVE for COVID-19 antibodies. This suggests you have not had or been exposed to COVID-19. But it does not mean that for sure.    The test finds antibodies in most people 10 days after they get sick. For some people, it takes longer than 10 days for antibodies to  show up. Others may never show antibodies against COVID-19, especially if they have weak immune systems.    If you have COVID-19 symptoms now, please stay home and away from others.     Your current symptoms may or may not be COVID-19.     What is antibody testing?  This is a kind of blood test. We take a small sample of your blood, and then test it for something called antibodies.   Your body makes antibodies to fight infection. If your blood has antibodies for a certain germ, it means youve been infected with that germ in the past.   Sometimes, antibodies stay in your body for years after youve had the infection. They can be there even if the germ didnt make you sick. They are a sign that your body fought off the infection.  Will this test find antibodies in everyone whos had COVID-19?  No. The test finds antibodies in most people 10 days after they get sick. For some people, it takes longer than 10 days for antibodies to show up. Others may never show antibodies against COVID-19, especially if they have weak immune systems.  What are the signs of COVID-19?  Signs of COVID-19 can appear from 2 to 14 days (up to 2 weeks) after youre infected. Some people have no symptoms or only mild symptoms. Others get very sick. The most common symptoms are:      Cough    Shortness of breath or trouble breathing    Or at least 2 of these symptoms:      Fever    Chills    Repeated shaking with chills    Muscle pain    Headache    Sore throat    Losing your sense of taste or smell    You may have other symptoms. Please contact your doctor or clinic for any symptoms that worry you.    Where can I get more information?     To learn the [facility] guidelines for staying home, please visit the [facility] of Health website at [email or web address]    To learn more about COVID-19 and how to care for yourself at home, please visit the CDC website at  https://www.cdc.gov/coronavirus/2019-ncov/about/steps-when-sick.html    For more options for care at [facility], please visit our website at [email or web address]    [place] Department of [facility] ([facility]) COVID-19 Hotline:  [phone]

## 2021-06-20 NOTE — LETTER
Letter by Mary Winn MD at      Author: Mary Winn MD Service: -- Author Type: --    Filed:  Encounter Date: 7/10/2020 Status: (Other)       7/10/2020        Gualberto Saunders  [address] [address]    This letter provides a written record that you were tested for COVID-19 on 7/5/2020.     Your result was negative. This means that we didnt find the virus that causes COVID-19 in your sample. A test may show negative when you do actually have the virus. This can happen when the virus is in the early stages of infection, before you feel illness symptoms.    If you have symptoms   Stay home and away from others (self-isolate) until you meet ALL of the guidelines below:    Youve had no fever--and no medicine that reduces fever--for 3 full days (72 hours). And ?    Your other symptoms have gotten better. For example, your cough or breathing has improved. And?    At least 10 days have passed since your symptoms started.    During this time:    Stay home. Dont go to work, school or anywhere else.     Stay in your own room, including for meals. Use your own bathroom if you can.    Stay away from others in your home. No hugging, kissing or shaking hands. No visitors.    Clean high touch surfaces often (doorknobs, counters, handles, etc.). Use a household cleaning spray or wipes. You can find a full list on the EPA website at www.epa.gov/pesticide-registration/list-n-disinfectants-use-against-sars-cov-2.    Cover your mouth and nose with a mask, tissue or washcloth to avoid spreading germs.    Wash your hands and face often with soap and water.    Going back to work  Check with your employer for any guidelines to follow for going back to work.    Employers: This document serves as formal notice that your employee tested negative for COVID-19, as of the testing date shown above.

## 2021-06-21 NOTE — PROGRESS NOTES
ASSESSMENT/PLAN:    BPV (benign positional vertigo)  1 wk of intermittent BPV  Provided her with home exercises  Suggested meclizine but she deferred for now  See PCP if not better in 2-4 wks, or sooner if symptoms worsen    Visit for screening mammogram  -     Mammo Screening Bilateral; Future    SUBJECTIVE:    Gualberto Saunders is a 53 y.o. female who came in today 1 week of vertigo symptom.  She has noticed it intermittently throughout this week.  It only happens with changing head position but does not always happen with every positions.  Symptoms last a few seconds and resolve spontaneously.  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  She has been noticing some itchiness of her right ear.  Denies hearing loss or tinnitus.  Denies fever, cough, facial pain pressure, postnasal drip, ear pain, ear trauma.  No change in medications.  Denies gastrointestinal symptoms such as nausea, vomiting, diarrhea.    Review of Systems (except those mentioned above)  Constitutional: Negative.   HENT: Negative.   Eyes: Negative.   Respiratory: Negative.   Cardiovascular: Negative.   Gastrointestinal: Negative.   Endocrine: Negative.   Genitourinary: Negative.   Musculoskeletal: Negative.   Skin: Negative.   Allergic/Immunologic: Negative.   Neurological: Negative.   Hematological: Negative.   Psychiatric/Behavioral: Negative.     Patient Active Problem List    Diagnosis Date Noted     Mild major depression (H) 08/09/2018     Anxiety 01/20/2017     S/P hysterectomy 10/20/2015     Subclinical hypothyroidism 06/05/2015     Hypertension 06/04/2015     Perimenopause 06/04/2015     Vitiligo 12/02/2014     Basal Cell Carcinoma Of The Skin      Cystocele      Esophageal Reflux      Metatarsal Spur Of The Left Foot      Allergies   Allergen Reactions     Vicryl Sutures Swelling     Current Outpatient Prescriptions   Medication Sig Dispense Refill      atenolol (TENORMIN) 25 MG tablet Take 1 tablet (25 mg total) by mouth daily. 90 tablet 3     escitalopram oxalate (LEXAPRO) 10 MG tablet Take 1 tablet (10 mg total) by mouth daily. 90 tablet 3     lansoprazole (PREVACID) 30 MG capsule Take 30 mg by mouth daily.       albuterol (PROVENTIL HFA;VENTOLIN HFA) 90 mcg/actuation inhaler Inhale 2 puffs every 6 (six) hours as needed for wheezing. 18 g 11     No current facility-administered medications for this visit.      Past Medical History:   Diagnosis Date     Depression      Elevated hemoglobin (H)      Hypertension      PONV (postoperative nausea and vomiting)      Uterine fibroid      Past Surgical History:   Procedure Laterality Date     ABLATION SAPHENOUS VEIN W/ RFA       HYSTERECTOMY  2015     [place] LAP,DIAGNOSTIC ABDOMEN      Description: Laparoscopy (Diagnostic);  Recorded: 09/03/2009;  Comments: for infertility     [place] LIGATE FALLOPIAN TUBE      Description: Tubal Ligation;  Recorded: 03/23/2009;     [place] REDUCTION OF LARGE BREAST      Description: Breast Surgery Reduction Procedure Elective;  Proc Date: 08/01/2009;     [place] REMOVE TONSILS/ADENOIDS,<11 Y/O      Description: Tonsillectomy With Adenoidectomy;  Recorded: 03/23/2009;     [place] VAGINAL HYSTERECTOMY,UTERUS 250 GMS/< N/A 10/20/2015    Procedure: HYSTERECTOMY VAGINAL;  Surgeon: Dominique Ngo MD;  Location: [facility];  Service: Gynecology     REDUCTION MAMMAPLASTY  2007     WISDOM TOOTH EXTRACTION       Social History     Social History     Marital status:      Spouse name: N/A     Number of children: N/A     Years of education: N/A     Occupational History     nurse [employer]     Social History Main Topics     Smoking status: Never Smoker     Smokeless tobacco: Never Used     Alcohol use 0.5 oz/week     1 drink(s) per week     Drug use: None     Sexual activity: Yes     Partners: Male     Other Topics Concern     None     Social History Narrative     Family History   Problem  Relation Age of Onset     Cancer Father      thyroid     Prostate cancer Father      Idiopathic pulmonary fibrosis Father      No Medical Problems Brother      Other Mother      ARDS (acute respiratory distress syndrome)     Pancreatitis Mother      exploratory laparotomy revealed absence of pancreas, possibly autoimmune     Breast cancer Paternal Grandmother 35     Colon polyps Daughter      pre CA- 22         OBJECTIVE:    Vitals:    10/29/18 1533   BP: 130/86   Patient Site: Left Arm   Patient Position: Sitting   Cuff Size: Adult Regular   Pulse: 68   Weight: 201 lb 1 oz (91.2 kg)     Body mass index is 30.35 kg/(m^2).    Physical Exam:  Constitutional: Patient was oriented to person, place, and time. Patient appeared well-developed and well-nourished. No distress.   Head: Normocephalic and atraumatic.   Right Ear: External ear normal.  Bit of wax otherwise normal TM  Left Ear: External ear normal. Normal TM  Nose: Nose normal.   Mouth/Throat: Oropharynx was clear and moist. No oropharyngeal exudate.   Eyes: Conjunctivae and EOM were normal. Pupils were equal, round, and reactive to light. Right eye exhibited no discharge. Left eye exhibited no discharge. No scleral icterus.   Neck: Neck supple. No JVD present. No tracheal deviation present. No thyromegaly present.   Lymphadenopathy:  Patient has no cervical adenopathy.   Cardiovascular: Normal rate, regular rhythm, normal heart sounds and intact distal pulses. No murmur heard.   Pulmonary/Chest: Effort normal and breath sounds normal. No stridor. No respiratory distress. Patient had no wheezes, no rales, exhibits no tenderness.   Skin: Skin was warm and dry. No rash noted. Patient was not diaphoretic. No erythema. No pallor.       Results for orders placed or performed in visit on 01/27/17   Thyroid Stimulating Hormone (TSH)   Result Value Ref Range    TSH 3.19 0.30 - 5.00 uIU/mL   T4, Free   Result Value Ref Range    Free T4 0.7 0.7 - 1.8 ng/dL   T3, Total    Result Value Ref Range    T3, Total 92 45 - 175 ng/dL   Lipid Cascade   Result Value Ref Range    Cholesterol 215 (H) <=199 mg/dL    Triglycerides 209 (H) <=149 mg/dL    HDL Cholesterol 49 (L) >=50 mg/dL    LDL Calculated 124 <=129 mg/dL    Patient Fasting > 8hrs? Yes    Vitamin D, Total (25-Hydroxy)   Result Value Ref Range    Vitamin D, Total (25-Hydroxy) 37.2 30.0 - 80.0 ng/mL

## 2021-06-25 NOTE — TELEPHONE ENCOUNTER
Refill Approved    Rx renewed per Medication Renewal Policy. Medication was last renewed on 9/13/20.    Sage Coyne, [facility] Triage/Med Refill 6/11/2021     Requested Prescriptions   Pending Prescriptions Disp Refills     atenoloL (TENORMIN) 25 MG tablet [Pharmacy Med Name: ATENOLOL 25 MG TABLET] 90 tablet 2     Sig: TAKE 1 TABLET BY MOUTH EVERY DAY       Beta-Blockers Refill Protocol Passed - 6/10/2021 12:23 AM        Passed - PCP or prescribing provider visit in past 12 months or next 3 months     Last office visit with prescriber/PCP: Visit date not found OR same dept: Visit date not found OR same specialty: 6/17/2019 Pablito Bobby MD  Last physical: 7/14/2020 Last MTM visit: Visit date not found   Next visit within 3 mo: Visit date not found  Next physical within 3 mo: Visit date not found  Prescriber OR PCP: Mary Winn MD  Last diagnosis associated with med order: 1. Essential hypertension  - atenoloL (TENORMIN) 25 MG tablet [Pharmacy Med Name: ATENOLOL 25 MG TABLET]; TAKE 1 TABLET BY MOUTH EVERY DAY  Dispense: 90 tablet; Refill: 2    If protocol passes may refill for 12 months if within 3 months of last provider visit (or a total of 15 months).             Passed - Blood pressure filed in past 12 months     BP Readings from Last 1 Encounters:   07/14/20 104/78

## 2021-06-29 NOTE — PROGRESS NOTES
Progress Notes by Mary Winn MD at 7/14/2020 11:00 AM     Author: Mary Winn MD Service: -- Author Type: Physician    Filed: 7/14/2020 11:51 AM Encounter Date: 7/14/2020 Status: Signed    : Mary Winn MD (Physician)       FEMALE PREVENTATIVE EXAM    Assessment and Plan:     Gualberto was seen today for annual exam.    Encounter to establish care    Routine general medical examination at a health care facility  Labs ordered earlier today  -     Td, Preservative Free (green label)    Mild major depression (H)  Anxiety  PHQ 9 score is 2, erin 7 score is 1.  Doing well with current SSRI.  Refill sent.  Exercise and mindfulness reviewed.  Declines option for therapy.  -     escitalopram oxalate (LEXAPRO) 10 MG tablet; Take 1 tablet (10 mg total) by mouth daily.    Insomnia  Continues on doxepin and tolerating without side effects.    Essential hypertension  Blood pressure well controlled.  Patient is new to me but currently on a beta-blocker which would not necessarily be first-line treatment without other indication for this.  At this point given the rare experience she has with asthma issues, okay to continue, but low threshold to change to this in the future.  Potentially helping somewhat with her anxiety.      Mild intermittent asthma without complication  Rare flares, 1X per year during viral triggered event.  PRN albuterol available as well as the Flovent for this time periods.  AAP updated, ACT score normal.  -     FLOVENT  mcg/actuation inhaler; TAKE 1 PUFF BY MOUTH TWICE A DAY    Subclinical hypothyroidism  Last TSH abnormal in 2015, will check TSH and T4 today.    Suspected COVID-19 virus infection  -     COVID-19 Virus (Coronavirus) Antibody & Titer Reflex; Future  -     COVID-19 Virus (Coronavirus) Antibody & Titer Reflex    BRCA negative- also had other negative genetic w/u for familial cancer syndromes     Gastroesophageal reflux disease without esophagitis  Hiatal  hernia  Continues on PPI, risks of this were reviewed and options for cimetidine or famotidine were reviewed      Next follow up:  Return in about 1 year (around 7/14/2021) for Annual physical.    Immunization Review  Adult Imm Review: Due today, orders placed    I discussed the following with the patient:   Adult Healthy Living: Importance of regular exercise  Healthy nutrition  Weight loss referral options  Getting adequate sleep  Stress management  Use of seat belts  Distracted driving  Helmets  Sporting equipment safety  Firearm safety  STI prevention  Contraception options  Supplement use  Herbal medications/alternative medical therapies        Subjective:   Chief Complaint: Gualberto Saunders is an 55 y.o. female here for a preventative health visit.     HPI:    Chief Complaint   Patient presents with   ? Annual Exam     fasting- already had labs drawn, no concerns         Depression/anxiety: She is on Lexapro 10 mg daily for a few years.    She does live with her daughter and her grandchild. The daughter has severe anxiety/OCD and sometimes this makes Gualberto anxious sometimes.    Insomnia: She takes doxepin 10 mg at bedtime to help with her sleep. Some nights ok and some nights not as great.    Hypertension: She is on 25 mg atenolol daily for this. Checks BPs at work, no concerns.  No lightheadedness, dizziness, chest pain or palpitations.  No lower extremity swelling.    Heartburn, hiatal hernia: She is currently on Prevacid 30 mg daily. Sometimes also takes tagamet. She did have an EGD and colonoscopy 2017 (one polyp- will follow up in 3 years). Strong gag reflex and woke during this procedure.     Viral triggered asthma: Flovent and albuterol when she gets sick with a cold.  Otherwise not using anything on a regular basis.  She has not had formal lung function testing in a long time but suspects greater than 20 years ago. She was hospitalized for bronchitis in 3rd grade for 2 weeks.    She had a partial  hysterectomy for menorrhagia s/t fibroids, this was removed ~2017. This was also when she had cystocele repair.   She is getting paps done with her Partner's ob/gyn still as her cervix is still present.      Did have a bad coughing spell in Feb and would like serology testing for COVID.    Her dad had herpes encephalitis- she is interested in the Shingrix vaccination.    Social History     Social History Narrative    She is an OR nurse at [employer].      , they have 3 daughters.  1 daughter, the fiancé and grandchild (1-year-old) is currently living with [place].      She enjoys walking 3 times per week, gardening, mowing the lawn for physical activity in the summer.    Occasional alcohol twice weekly, no smoking.    Mary Winn MD       Healthy Habits  Are you taking a daily aspirin? No  Do you typically exercising at least 40 min, 3-4 times per week?  Yes  Do you usually eat at least 4 servings of fruit and vegetables a day, include whole grains and fiber and avoid regularly eating high fat foods? NO  Have you had an eye exam in the past two years? Yes  Do you see a dentist twice per year? Yes  Do you have any concerns regarding sleep? No    Safety Screen  If you own firearms, are they secured in a locked gun cabinet or with trigger locks? The patient does not own any firearms  Do you feel you are safe where you are living?: Yes (2/17/2020  1:05 PM)  Do you feel you are safe in your relationship(s)?: Yes (2/17/2020  1:05 PM)      Review of Systems:  Please see above.  The rest of the review of systems are negative for all systems.       Cancer Screening       Status Date      MAMMOGRAM Overdue 4/3/2018      Done 4/3/2017 MAMMO SCREENING BILATERAL     Patient has more history with this topic...    PAP SMEAR This plan is no longer active.           Patient Care Team:  Mary Winn MD as PCP - General (Family Medicine)  Pablito Bobby MD as Assigned  "PCP        History     Reviewed By Date/Time Sections Reviewed    Mary Winn MD 7/14/2020 11:46 AM Medical, Surgical, Tobacco, Family    Mary Winn MD 7/14/2020 11:43 AM Obstetric    Mary Winn MD 7/14/2020 11:42 AM Social Documentation    Marti Evans LPN 7/14/2020 11:04 AM Tobacco            Objective:   Vital Signs:   Visit Vitals  /78 (Patient Site: Left Arm, Patient Position: Sitting, Cuff Size: Adult Large)   Pulse 60   Ht 5' 8.75\" (1.746 m)   Wt 215 lb 6.4 oz (97.7 kg)   LMP 10/16/2015   BMI 32.04 kg/m           PHYSICAL EXAM  Constitutional: Patient is oriented to person, place, and time. Patient appears well-developed and well-nourished. No distress.   Head: Normocephalic and atraumatic.   Ears: External ear and TMs normal bilaterally.  Nose: Nose normal.   Mouth/Throat: Oropharynx is clear and moist. No oropharyngeal exudate.   Eyes: Conjunctivae and EOM are normal. Pupils are equal, round, and reactive to light. No discharge. No scleral icterus.   Neck: Neck supple. No JVD present. No tracheal deviation present. No thyromegaly present.  Breasts: not indicated based on USPSTF recommendations for asymptomatic women  Cardiovascular: Normal rate, regular rhythm, normal heart sounds and intact distal pulses. No murmur heard.   Pulmonary/Chest: Effort normal and breath sounds normal. Patient has no wheezes, no rales, exhibits no tenderness.   Abdominal: Soft. Bowel sounds are normal. No masses. There is no tenderness.   Lymphadenopathy:  Patient has no cervical adenopathy.   Neurological: Patient is alert and oriented to person, place, and time. Patient has normal reflexes. No cranial nerve deficit. Coordination normal.   Skin: Skin is warm and dry. No rash noted. No pallor.   Pelvic: not indicated based on USPSTF recommendations for asymptomatic women  Psychiatric: Patient has good eye contact without any psychomotor retardation or stereotypic behaviors.  normal " mood and affect. Judgment and thought content normal.   Speech is regular rate and rhythm.       The ASCVD Risk score ([place]hope JEAN Jr., et al., 2013) failed to calculate for the following reasons:    Cannot find a previous HDL lab    Cannot find a previous total cholesterol lab         Medication List          Accurate as of July 14, 2020 11:47 AM. If you have any questions, ask your nurse or doctor.            CONTINUE taking these medications    albuterol 90 mcg/actuation inhaler  Also known as:  PROAIR HFA;PROVENTIL HFA;VENTOLIN HFA  INSTRUCTIONS:  Inhale 2 puffs every 6 (six) hours as needed for wheezing.  Doctor's comments:  May substitute Proair HFA, Ventolin HFA, albuterol HFA, Xopenex HFA        atenoloL 25 MG tablet  Also known as:  TENORMIN  INSTRUCTIONS:  TAKE 1 TABLET BY MOUTH EVERY DAY        doxepin 10 MG capsule  Also known as:  SINEquan  INSTRUCTIONS:  TAKE 1 CAPSULE (10 MG TOTAL) BY MOUTH AT BEDTIME.        escitalopram oxalate 10 MG tablet  Also known as:  LEXAPRO  INSTRUCTIONS:  Take 1 tablet (10 mg total) by mouth daily.        Flovent  mcg/actuation inhaler  INSTRUCTIONS:  TAKE 1 PUFF BY MOUTH TWICE A DAY  Generic drug:  fluticasone propionate        lansoprazole 30 MG capsule  Also known as:  PREVACID  INSTRUCTIONS:  Take 30 mg by mouth daily.        spironolactone 100 MG tablet  Also known as:  ALDACTONE  INSTRUCTIONS:  TAKE ONE TAB BY MOUTH ONCE DAILY IN THE MORNING           STOP taking these medications    omeprazole 10 MG capsule  Also known as:  PriLOSEC  Stopped by:  Mary Winn MD           Where to Get Your Medications      These medications were sent to [place] [id number] [place] [address] [address]    Phone:  [phone]     escitalopram oxalate 10 MG tablet    Flovent  mcg/actuation inhaler         Additional Screenings Completed Today:   See flow sheet for PHQ 9, erin 7 and ACT score

## 2021-07-21 ENCOUNTER — RECORDS - HEALTHEAST (OUTPATIENT)
Dept: ADMINISTRATIVE | Facility: CLINIC | Age: 56
End: 2021-07-21

## 2021-08-15 ENCOUNTER — HEALTH MAINTENANCE LETTER (OUTPATIENT)
Age: 56
End: 2021-08-15

## 2021-09-03 ENCOUNTER — TRANSFERRED RECORDS (OUTPATIENT)
Dept: HEALTH INFORMATION MANAGEMENT | Facility: CLINIC | Age: 56
End: 2021-09-03

## 2021-09-07 DIAGNOSIS — I10 ESSENTIAL HYPERTENSION: ICD-10-CM

## 2021-09-07 RX ORDER — ATENOLOL 25 MG/1
TABLET ORAL
Qty: 90 TABLET | Refills: 0 | Status: SHIPPED | OUTPATIENT
Start: 2021-09-07 | End: 2021-12-07

## 2021-09-07 NOTE — TELEPHONE ENCOUNTER
"Routing refill request to provider for review/approval because:  Labs not current:  BP  Patient needs to be seen because it has been more than 1 year since last office visit.    Last Written Prescription Date:  6/11/21  Last Fill Quantity: 90,  # refills: 0   Last office visit provider:  7/20/20     Requested Prescriptions   Pending Prescriptions Disp Refills     atenolol (TENORMIN) 25 MG tablet [Pharmacy Med Name: ATENOLOL 25 MG TABLET] 90 tablet 0     Sig: TAKE 1 TABLET BY MOUTH EVERY DAY       Beta-Blockers Protocol Failed - 9/7/2021 12:33 AM        Failed - Blood pressure under 140/90 in past 12 months     BP Readings from Last 3 Encounters:   07/14/20 104/78   02/17/20 106/70                 Failed - Recent (12 mo) or future (30 days) visit within the authorizing provider's specialty     Patient has had an office visit with the authorizing provider or a provider within the authorizing providers department within the previous 12 mos or has a future within next 30 days. See \"Patient Info\" tab in inbasket, or \"Choose Columns\" in Meds & Orders section of the refill encounter.              Passed - Patient is age 6 or older        Passed - Medication is active on med list             Sage Coyne RN 09/07/21 8:56 AM  "

## 2021-09-10 DIAGNOSIS — F51.01 PRIMARY INSOMNIA: ICD-10-CM

## 2021-09-10 NOTE — TELEPHONE ENCOUNTER
"Routing refill request to provider for review/approval because:  Labs not current:  BP  Patient needs to be seen because it has been more than 1 year since last office visit.    Last Written Prescription Date:  3/8/21  Last Fill Quantity: 90,  # refills: 1   Last office visit provider:  7/20/20     Requested Prescriptions   Pending Prescriptions Disp Refills     doxepin (SINEQUAN) 10 MG capsule [Pharmacy Med Name: DOXEPIN 10 MG CAPSULE] 90 capsule 1     Sig: TAKE 1 CAPSULE (10 MG TOTAL) BY MOUTH AT BEDTIME       Tricyclic Antidepressants Protocol Failed - 9/10/2021 12:35 AM        Failed - Blood pressure under 140/90 in past 12 months     BP Readings from Last 3 Encounters:   07/14/20 104/78   02/17/20 106/70                 Failed - Recent (12 mo) or future (30 days) visit within the authorizing provider's specialty      Patient has had an office visit with the authorizing provider or a provider within the authorizing providers department within the previous 12 mos or has a future within next 30 days. See \"Patient Info\" tab in inbasket, or \"Choose Columns\" in Meds & Orders section of the refill encounter.              Passed - Medication is active on med list        Passed - Patient is age 18 or older        Passed - No active pregnancy on record        Passed - No positive pregnancy test in past 12 months             Sage Coyne RN 09/10/21 2:18 PM  "

## 2021-09-13 RX ORDER — DOXEPIN HYDROCHLORIDE 10 MG/1
CAPSULE ORAL
Qty: 90 CAPSULE | Refills: 0 | Status: SHIPPED | OUTPATIENT
Start: 2021-09-13 | End: 2021-12-09

## 2021-10-10 ENCOUNTER — HEALTH MAINTENANCE LETTER (OUTPATIENT)
Age: 56
End: 2021-10-10

## 2021-10-11 DIAGNOSIS — E03.8 SUBCLINICAL HYPOTHYROIDISM: ICD-10-CM

## 2021-10-11 NOTE — TELEPHONE ENCOUNTER
"Routing refill request to provider for review/approval because:  Labs out of range:  TSH  Overdue for OV as well.    Last Written Prescription Date:  4/12/21  Last Fill Quantity: 90,  # refills: 1   Last office visit provider: 7/20/20     Requested Prescriptions   Pending Prescriptions Disp Refills     levothyroxine (SYNTHROID/LEVOTHROID) 50 MCG tablet [Pharmacy Med Name: LEVOTHYROXINE 50 MCG TABLET] 90 tablet 1     Sig: TAKE 1 TABLET BY MOUTH EVERY DAY       Thyroid Protocol Failed - 10/11/2021 12:22 AM        Failed - Recent (12 mo) or future (30 days) visit within the authorizing provider's specialty     Patient has had an office visit with the authorizing provider or a provider within the authorizing providers department within the previous 12 mos or has a future within next 30 days. See \"Patient Info\" tab in inbasket, or \"Choose Columns\" in Meds & Orders section of the refill encounter.              Failed - Normal TSH on file in past 12 months     Recent Labs   Lab Test 10/13/20  1606   TSH 5.80*              Passed - Patient is 12 years or older        Passed - Medication is active on med list        Passed - No active pregnancy on record     If patient is pregnant or has had a positive pregnancy test, please check TSH.          Passed - No positive pregnancy test in past 12 months     If patient is pregnant or has had a positive pregnancy test, please check TSH.               Allegra Rogers RN 10/11/21 6:59 PM  "

## 2021-10-12 RX ORDER — LEVOTHYROXINE SODIUM 50 UG/1
TABLET ORAL
Qty: 90 TABLET | Refills: 0 | Status: SHIPPED | OUTPATIENT
Start: 2021-10-12 | End: 2022-01-11

## 2021-10-19 PROBLEM — F32.9 MAJOR DEPRESSION: Status: ACTIVE | Noted: 2018-08-09

## 2021-12-02 DIAGNOSIS — I10 ESSENTIAL HYPERTENSION: ICD-10-CM

## 2021-12-04 NOTE — TELEPHONE ENCOUNTER
"Routing refill request to provider for review/approval because:    Patient was given arin period refill already and advised to schedule a physical appt, but has not followed up.  Patient needs to be seen because it has been more than 1 year since last office visit.  No blood pressure on file in the last year.    Last Written Prescription Date:  09/07/2021  Last Fill Quantity: 90,  # refills: 0   Last office visit provider:  07/20/2020 with Dr Winn.    Requested Prescriptions   Pending Prescriptions Disp Refills     atenolol (TENORMIN) 25 MG tablet [Pharmacy Med Name: ATENOLOL 25 MG TABLET] 90 tablet 0     Sig: TAKE 1 TABLET BY MOUTH EVERY DAY       Beta-Blockers Protocol Failed - 12/2/2021 12:23 AM        Failed - Blood pressure under 140/90 in past 12 months     BP Readings from Last 3 Encounters:   07/14/20 104/78   02/17/20 106/70                 Failed - Recent (12 mo) or future (30 days) visit within the authorizing provider's specialty     Patient has had an office visit with the authorizing provider or a provider within the authorizing providers department within the previous 12 mos or has a future within next 30 days. See \"Patient Info\" tab in inbasket, or \"Choose Columns\" in Meds & Orders section of the refill encounter.              Passed - Patient is age 6 or older        Passed - Medication is active on med list             May Martel 12/03/21 9:15 PM  "

## 2021-12-07 RX ORDER — ATENOLOL 25 MG/1
TABLET ORAL
Qty: 30 TABLET | Refills: 0 | Status: SHIPPED | OUTPATIENT
Start: 2021-12-07 | End: 2022-01-05

## 2021-12-08 DIAGNOSIS — F51.01 PRIMARY INSOMNIA: ICD-10-CM

## 2021-12-08 NOTE — TELEPHONE ENCOUNTER
Left message to call back.  Information to relay to patient:      Please help schedule annual exam/labs due (last visit 7/2020)

## 2021-12-09 RX ORDER — DOXEPIN HYDROCHLORIDE 10 MG/1
CAPSULE ORAL
Qty: 30 CAPSULE | Refills: 0 | Status: SHIPPED | OUTPATIENT
Start: 2021-12-09 | End: 2022-01-05

## 2021-12-09 NOTE — TELEPHONE ENCOUNTER
"Former patient of ??? & has not established care with another provider.  Please assign refill request to covering provider per clinic standard process.    Routing refill request to provider for review/approval because:  Patient needs to be seen because it has been more than 1 year since last office visit.  BP not current  No PCP    Last Written Prescription Date:  9/13/21  Last Fill Quantity: 90,  # refills: 0   Last office visit provider:  7/20/20     Requested Prescriptions   Pending Prescriptions Disp Refills     doxepin (SINEQUAN) 10 MG capsule [Pharmacy Med Name: DOXEPIN 10 MG CAPSULE] 90 capsule 0     Sig: TAKE 1 CAPSULE (10 MG TOTAL) BY MOUTH AT BEDTIME       Tricyclic Antidepressants Protocol Failed - 12/8/2021 12:29 AM        Failed - Blood pressure under 140/90 in past 12 months     BP Readings from Last 3 Encounters:   07/14/20 104/78   02/17/20 106/70                 Failed - Recent (12 mo) or future (30 days) visit within the authorizing provider's specialty      Patient has had an office visit with the authorizing provider or a provider within the authorizing providers department within the previous 12 mos or has a future within next 30 days. See \"Patient Info\" tab in inbasket, or \"Choose Columns\" in Meds & Orders section of the refill encounter.              Passed - Medication is active on med list        Passed - Patient is age 18 or older        Passed - No active pregnancy on record        Passed - No positive pregnancy test in past 12 months             Sage Coyne RN 12/09/21 8:56 AM  "

## 2021-12-15 DIAGNOSIS — F32.0 MILD MAJOR DEPRESSION (H): ICD-10-CM

## 2021-12-15 DIAGNOSIS — F41.9 ANXIETY: ICD-10-CM

## 2021-12-16 RX ORDER — ESCITALOPRAM OXALATE 10 MG/1
TABLET ORAL
Qty: 90 TABLET | Refills: 0 | Status: SHIPPED | OUTPATIENT
Start: 2021-12-16 | End: 2022-03-12

## 2021-12-16 NOTE — TELEPHONE ENCOUNTER
"Routing refill request to provider for review/approval because:  PHQ9 requires provider review.  Last OV 7/20/2020.    Last Written Prescription Date:  9/20/21  Last Fill Quantity: 90,  # refills: 0   Last office visit provider:  7/20/2020     Requested Prescriptions   Pending Prescriptions Disp Refills     escitalopram (LEXAPRO) 10 MG tablet [Pharmacy Med Name: ESCITALOPRAM 10 MG TABLET] 90 tablet 0     Sig: TAKE 1 TABLET BY MOUTH EVERY DAY       SSRIs Protocol Failed - 12/15/2021 12:50 AM        Failed - PHQ-9 score less than 5 in past 6 months     Please review last PHQ-9 score.           Failed - Recent (6 mo) or future (30 days) visit within the authorizing provider's specialty     Patient had office visit in the last 6 months or has a visit in the next 30 days with authorizing provider or within the authorizing provider's specialty.  See \"Patient Info\" tab in inbasket, or \"Choose Columns\" in Meds & Orders section of the refill encounter.            Passed - Medication is active on med list        Passed - Patient is age 18 or older        Passed - No active pregnancy on record        Passed - No positive pregnancy test in last 12 months             Nancy Apodaca RN 12/16/21 3:42 PM  "

## 2022-01-03 DIAGNOSIS — I10 ESSENTIAL HYPERTENSION: ICD-10-CM

## 2022-01-03 DIAGNOSIS — F51.01 PRIMARY INSOMNIA: ICD-10-CM

## 2022-01-05 RX ORDER — DOXEPIN HYDROCHLORIDE 10 MG/1
CAPSULE ORAL
Qty: 30 CAPSULE | Refills: 0 | Status: SHIPPED | OUTPATIENT
Start: 2022-01-05 | End: 2022-02-01

## 2022-01-05 RX ORDER — ATENOLOL 25 MG/1
TABLET ORAL
Qty: 30 TABLET | Refills: 0 | Status: SHIPPED | OUTPATIENT
Start: 2022-01-05 | End: 2022-02-01

## 2022-01-05 NOTE — TELEPHONE ENCOUNTER
"Routing refill request to provider for review/approval because:  BP not current  Patient needs to be seen because it has been more than 1 year since last office visit.  PCP information not populated in chart.    Last Written Prescription Date:  12/7/21  Last Fill Quantity: 30,  # refills: 0   Last office visit provider:  7/20/20     Requested Prescriptions   Pending Prescriptions Disp Refills     atenolol (TENORMIN) 25 MG tablet [Pharmacy Med Name: ATENOLOL 25 MG TABLET] 30 tablet 0     Sig: TAKE 1 TABLET BY MOUTH EVERY DAY       Beta-Blockers Protocol Failed - 1/3/2022 12:35 AM        Failed - Blood pressure under 140/90 in past 12 months     BP Readings from Last 3 Encounters:   07/14/20 104/78   02/17/20 106/70                 Failed - Recent (12 mo) or future (30 days) visit within the authorizing provider's specialty     Patient has had an office visit with the authorizing provider or a provider within the authorizing providers department within the previous 12 mos or has a future within next 30 days. See \"Patient Info\" tab in inbasket, or \"Choose Columns\" in Meds & Orders section of the refill encounter.              Passed - Patient is age 6 or older        Passed - Medication is active on med list           doxepin (SINEQUAN) 10 MG capsule [Pharmacy Med Name: DOXEPIN 10 MG CAPSULE] 30 capsule 0     Sig: TAKE 1 CAPSULE BY MOUTH AT BEDTIME.       Tricyclic Antidepressants Protocol Failed - 1/3/2022 12:35 AM        Failed - Blood pressure under 140/90 in past 12 months     BP Readings from Last 3 Encounters:   07/14/20 104/78   02/17/20 106/70                 Failed - Recent (12 mo) or future (30 days) visit within the authorizing provider's specialty      Patient has had an office visit with the authorizing provider or a provider within the authorizing providers department within the previous 12 mos or has a future within next 30 days. See \"Patient Info\" tab in inbasket, or \"Choose Columns\" in Meds & Orders " section of the refill encounter.              Passed - Medication is active on med list        Passed - Patient is age 18 or older        Passed - No active pregnancy on record        Passed - No positive pregnancy test in past 12 months             Sage Coyne RN 01/05/22 9:58 AM

## 2022-01-09 DIAGNOSIS — E03.8 SUBCLINICAL HYPOTHYROIDISM: ICD-10-CM

## 2022-01-11 RX ORDER — LEVOTHYROXINE SODIUM 50 UG/1
TABLET ORAL
Qty: 90 TABLET | Refills: 0 | Status: SHIPPED | OUTPATIENT
Start: 2022-01-11 | End: 2022-04-07

## 2022-01-11 NOTE — TELEPHONE ENCOUNTER
"Routing refill request to provider for review/approval because:  Labs not current:  TSH  Patient needs to be seen because it has been more than 1 year since last office visit.  PCP information not populated in chart.    Last Written Prescription Date:  10/12/21  Last Fill Quantity: 90,  # refills: 0   Last office visit provider:  7/20/20     Requested Prescriptions   Pending Prescriptions Disp Refills     levothyroxine (SYNTHROID/LEVOTHROID) 50 MCG tablet [Pharmacy Med Name: LEVOTHYROXINE 50 MCG TABLET] 90 tablet 0     Sig: TAKE 1 TABLET BY MOUTH EVERY DAY       Thyroid Protocol Failed - 1/9/2022 12:18 AM        Failed - Recent (12 mo) or future (30 days) visit within the authorizing provider's specialty     Patient has had an office visit with the authorizing provider or a provider within the authorizing providers department within the previous 12 mos or has a future within next 30 days. See \"Patient Info\" tab in inbasket, or \"Choose Columns\" in Meds & Orders section of the refill encounter.              Failed - Normal TSH on file in past 12 months     Recent Labs   Lab Test 10/13/20  1606   TSH 5.80*              Passed - Patient is 12 years or older        Passed - Medication is active on med list        Passed - No active pregnancy on record     If patient is pregnant or has had a positive pregnancy test, please check TSH.          Passed - No positive pregnancy test in past 12 months     If patient is pregnant or has had a positive pregnancy test, please check TSH.               Sage Coyne RN 01/11/22 1:01 PM  "

## 2022-01-22 ENCOUNTER — E-VISIT (OUTPATIENT)
Dept: FAMILY MEDICINE | Facility: CLINIC | Age: 57
End: 2022-01-22
Payer: COMMERCIAL

## 2022-01-22 DIAGNOSIS — J45.20 MILD INTERMITTENT ASTHMA WITHOUT COMPLICATION: Primary | ICD-10-CM

## 2022-01-22 PROBLEM — F32.0 MILD MAJOR DEPRESSION (H): Status: ACTIVE | Noted: 2018-08-09

## 2022-01-22 PROBLEM — Z13.71 BRCA NEGATIVE: Status: ACTIVE | Noted: 2020-07-14

## 2022-01-22 PROBLEM — K21.9 GASTROESOPHAGEAL REFLUX DISEASE WITHOUT ESOPHAGITIS: Status: ACTIVE | Noted: 2017-10-23

## 2022-01-22 PROBLEM — F41.9 ANXIETY: Status: ACTIVE | Noted: 2017-01-20

## 2022-01-22 PROCEDURE — 99421 OL DIG E/M SVC 5-10 MIN: CPT | Performed by: PHYSICIAN ASSISTANT

## 2022-01-22 RX ORDER — DEXAMETHASONE 4 MG/1
TABLET ORAL
Qty: 12 G | Refills: 0 | Status: SHIPPED | OUTPATIENT
Start: 2022-01-22 | End: 2024-01-19

## 2022-01-22 RX ORDER — PREDNISONE 20 MG/1
40 TABLET ORAL DAILY
Qty: 10 TABLET | Refills: 0 | Status: SHIPPED | OUTPATIENT
Start: 2022-01-22 | End: 2022-01-27

## 2022-01-22 NOTE — PATIENT INSTRUCTIONS
Dear Gualberto Saunders    I sent in a prescription for Flovent and a 5-day burst of prednisone.  It appears you have an asthma exacerbation and possible bronchitis likely secondary to a viral illness.  The symptoms will likely last for 1 to 2 weeks.  Your cough may linger longer.  If you develop any new or worsening symptoms please follow-up in the clinic    Thanks for choosing us as your health care partner,    Bubba Chamorro PA-C

## 2022-01-29 DIAGNOSIS — F51.01 PRIMARY INSOMNIA: ICD-10-CM

## 2022-01-30 DIAGNOSIS — I10 ESSENTIAL HYPERTENSION: ICD-10-CM

## 2022-01-31 NOTE — TELEPHONE ENCOUNTER
"Former patient of ??? & has not established care with another provider.  Please assign refill request to covering provider per clinic standard process.    Routing refill request to provider for review/approval because:  BP not current  Patient needs to be seen because it has been more than 1 year since last office visit.  No PCP    Last Written Prescription Date:  1/5/22  Last Fill Quantity: 30,  # refills: 0   Last office visit provider:  7/20/20     Requested Prescriptions   Pending Prescriptions Disp Refills     doxepin (SINEQUAN) 10 MG capsule [Pharmacy Med Name: DOXEPIN 10 MG CAPSULE] 30 capsule 0     Sig: TAKE 1 CAPSULE BY MOUTH EVERYDAY AT BEDTIME       Tricyclic Antidepressants Protocol Failed - 1/29/2022 12:18 AM        Failed - Blood pressure under 140/90 in past 12 months     BP Readings from Last 3 Encounters:   07/14/20 104/78   02/17/20 106/70                 Passed - Recent (12 mo) or future (30 days) visit within the authorizing provider's specialty      Patient has had an office visit with the authorizing provider or a provider within the authorizing providers department within the previous 12 mos or has a future within next 30 days. See \"Patient Info\" tab in inbasket, or \"Choose Columns\" in Meds & Orders section of the refill encounter.              Passed - Medication is active on med list        Passed - Patient is age 18 or older        Passed - No active pregnancy on record        Passed - No positive pregnancy test in past 12 months             Sage Coyne RN 01/31/22 12:35 PM  "

## 2022-02-01 RX ORDER — ATENOLOL 25 MG/1
TABLET ORAL
Qty: 30 TABLET | Refills: 0 | Status: SHIPPED | OUTPATIENT
Start: 2022-02-01 | End: 2022-03-02

## 2022-02-01 RX ORDER — DOXEPIN HYDROCHLORIDE 10 MG/1
CAPSULE ORAL
Qty: 30 CAPSULE | Refills: 0 | Status: SHIPPED | OUTPATIENT
Start: 2022-02-01 | End: 2022-03-02

## 2022-02-01 NOTE — TELEPHONE ENCOUNTER
"Routing refill request to provider for review/approval because:  No blood pressure on file in over 1 year.  Patient needs to be seen as it has been more than 1 year since last office visit.    Last Written Prescription Date:  01/05/2022  Last Fill Quantity: 30,  # refills: 0   Last office visit provider:  07/20/2020 with Dr Winn.      Requested Prescriptions   Pending Prescriptions Disp Refills     atenolol (TENORMIN) 25 MG tablet [Pharmacy Med Name: ATENOLOL 25 MG TABLET] 30 tablet 0     Sig: TAKE 1 TABLET BY MOUTH EVERY DAY       Beta-Blockers Protocol Failed - 1/30/2022 12:29 AM        Failed - Blood pressure under 140/90 in past 12 months     BP Readings from Last 3 Encounters:   07/14/20 104/78   02/17/20 106/70                 Passed - Patient is age 6 or older        Passed - Recent (12 mo) or future (30 days) visit within the authorizing provider's specialty     Patient has had an office visit with the authorizing provider or a provider within the authorizing providers department within the previous 12 mos or has a future within next 30 days. See \"Patient Info\" tab in inbasket, or \"Choose Columns\" in Meds & Orders section of the refill encounter.              Passed - Medication is active on med list             May Martel 01/31/22 7:51 PM  "

## 2022-02-18 ENCOUNTER — OFFICE VISIT (OUTPATIENT)
Dept: FAMILY MEDICINE | Facility: CLINIC | Age: 57
End: 2022-02-18
Payer: COMMERCIAL

## 2022-02-18 VITALS
WEIGHT: 218 LBS | DIASTOLIC BLOOD PRESSURE: 72 MMHG | BODY MASS INDEX: 32.43 KG/M2 | HEART RATE: 64 BPM | SYSTOLIC BLOOD PRESSURE: 112 MMHG

## 2022-02-18 DIAGNOSIS — I10 ESSENTIAL HYPERTENSION: Primary | ICD-10-CM

## 2022-02-18 DIAGNOSIS — E03.8 SUBCLINICAL HYPOTHYROIDISM: ICD-10-CM

## 2022-02-18 DIAGNOSIS — F41.9 ANXIETY: ICD-10-CM

## 2022-02-18 DIAGNOSIS — F51.01 PRIMARY INSOMNIA: ICD-10-CM

## 2022-02-18 DIAGNOSIS — J45.20 MILD INTERMITTENT ASTHMA WITHOUT COMPLICATION: ICD-10-CM

## 2022-02-18 DIAGNOSIS — F32.0 MILD MAJOR DEPRESSION (H): ICD-10-CM

## 2022-02-18 LAB
ANION GAP SERPL CALCULATED.3IONS-SCNC: 11 MMOL/L (ref 5–18)
BUN SERPL-MCNC: 8 MG/DL (ref 8–22)
CALCIUM SERPL-MCNC: 9.5 MG/DL (ref 8.5–10.5)
CHLORIDE BLD-SCNC: 101 MMOL/L (ref 98–107)
CO2 SERPL-SCNC: 28 MMOL/L (ref 22–31)
CREAT SERPL-MCNC: 0.66 MG/DL (ref 0.6–1.1)
ERYTHROCYTE [DISTWIDTH] IN BLOOD BY AUTOMATED COUNT: 12.2 % (ref 10–15)
GFR SERPL CREATININE-BSD FRML MDRD: >90 ML/MIN/1.73M2
GLUCOSE BLD-MCNC: 110 MG/DL (ref 70–125)
HCT VFR BLD AUTO: 46.1 % (ref 35–47)
HGB BLD-MCNC: 15.8 G/DL (ref 11.7–15.7)
MCH RBC QN AUTO: 33.7 PG (ref 26.5–33)
MCHC RBC AUTO-ENTMCNC: 34.3 G/DL (ref 31.5–36.5)
MCV RBC AUTO: 98 FL (ref 78–100)
PLATELET # BLD AUTO: 233 10E3/UL (ref 150–450)
POTASSIUM BLD-SCNC: 4.5 MMOL/L (ref 3.5–5)
RBC # BLD AUTO: 4.69 10E6/UL (ref 3.8–5.2)
SODIUM SERPL-SCNC: 140 MMOL/L (ref 136–145)
TSH SERPL DL<=0.005 MIU/L-ACNC: 1.85 UIU/ML (ref 0.3–5)
WBC # BLD AUTO: 6.4 10E3/UL (ref 4–11)

## 2022-02-18 PROCEDURE — 84443 ASSAY THYROID STIM HORMONE: CPT | Performed by: FAMILY MEDICINE

## 2022-02-18 PROCEDURE — 85027 COMPLETE CBC AUTOMATED: CPT | Performed by: FAMILY MEDICINE

## 2022-02-18 PROCEDURE — 36415 COLL VENOUS BLD VENIPUNCTURE: CPT | Performed by: FAMILY MEDICINE

## 2022-02-18 PROCEDURE — 99214 OFFICE O/P EST MOD 30 MIN: CPT | Performed by: FAMILY MEDICINE

## 2022-02-18 PROCEDURE — 80048 BASIC METABOLIC PNL TOTAL CA: CPT | Performed by: FAMILY MEDICINE

## 2022-02-18 ASSESSMENT — ANXIETY QUESTIONNAIRES
3. WORRYING TOO MUCH ABOUT DIFFERENT THINGS: NOT AT ALL
GAD7 TOTAL SCORE: 1
IF YOU CHECKED OFF ANY PROBLEMS ON THIS QUESTIONNAIRE, HOW DIFFICULT HAVE THESE PROBLEMS MADE IT FOR YOU TO DO YOUR WORK, TAKE CARE OF THINGS AT HOME, OR GET ALONG WITH OTHER PEOPLE: NOT DIFFICULT AT ALL
7. FEELING AFRAID AS IF SOMETHING AWFUL MIGHT HAPPEN: NOT AT ALL
6. BECOMING EASILY ANNOYED OR IRRITABLE: SEVERAL DAYS
1. FEELING NERVOUS, ANXIOUS, OR ON EDGE: NOT AT ALL
5. BEING SO RESTLESS THAT IT IS HARD TO SIT STILL: NOT AT ALL
2. NOT BEING ABLE TO STOP OR CONTROL WORRYING: NOT AT ALL

## 2022-02-18 ASSESSMENT — ASTHMA QUESTIONNAIRES: ACT_TOTALSCORE: 23

## 2022-02-18 ASSESSMENT — PATIENT HEALTH QUESTIONNAIRE - PHQ9
5. POOR APPETITE OR OVEREATING: NOT AT ALL
SUM OF ALL RESPONSES TO PHQ QUESTIONS 1-9: 1

## 2022-02-18 NOTE — PROGRESS NOTES
Assessment/plan   Gualberto Saunders is a 56 year old female who is established to my practice.    Gualberto was seen today for recheck medication.    Diagnoses and all orders for this visit:    Essential hypertension  Blood pressure well controlled.  Pt has been on a beta-blocker which would not necessarily be first-line treatment without other indication for this.  At this point given the rare experience she has with asthma issues, okay to continue, but low threshold to change to this in the future.  Potentially helping somewhat with her anxiety.     Mild intermittent asthma without complication  Rare flares, 1X per year during viral triggered event.  PRN albuterol available as well as the Flovent for this time periods.  AAP updated, ACT score normal.  -     FLOVENT  mcg/actuation inhaler; TAKE 1 PUFF BY MOUTH TWICE A DAY     Subclinical hypothyroidism  Was started on Synthroid 7/2020 for TSH of 8 with normal free T4 after a risk/benefit conversation.   - Continue levothyroxine (SYNTHROID)  50mcg   - Thyroid Stimulating Hormone (TSH) due     Anxiety  Depression  Doing well with current SSRI.   Exercise and mindfulness reviewed.  Declines option for therapy.  -     Continue escitalopram oxalate (LEXAPRO) 10 MG tablet; Take 1 tablet (10 mg total) by mouth daily.     Insomnia  Continues on doxepin and tolerating without side effects.     Mixed hyperlipidemia,  High TG in 230, low HDL    BRCA negative- also had other negative genetic w/u for familial cancer syndromes     Gastroesophageal reflux disease without esophagitis  Hiatal hernia  Continues on PPI, risks of this were reviewed and options for cimetidine or famotidine were reviewed    Follow up: Return in about 6 months (around 8/18/2022) for Annual physical.      Mary Winn MD    Subjective:      HPI: Gualberto Saunders is a 56 year old female who is here for:    Chief Complaint   Patient presents with     Recheck Medication     Follow up       Follow  "up - last visit 7/2020: Here to review her meds after about 1.5 years; feeling well/stable overall.       Flu shot she gets at work each year.    Depression/anxiety: She is on Lexapro 10 mg daily for a few years. Her daughter finally moved out (she has OCD and anxiety) so now things are calmer.      Insomnia: She takes doxepin 10 mg at bedtime to help with her sleep. Some nights ok and some nights not as great and takes a unisom in addition. Sometimes wakes at 3am.   Snores only when she is extremely tired. Doesn't wake up exhausted.     Hypertension: She is on 25 mg atenolol daily for this. Checks BPs at work, no concerns.  No lightheadedness, dizziness, chest pain or palpitations.  No lower extremity swelling.  During the summer when she walks she gets very flushed and hot feels it might be related to the atenolol  Doesn't have the issue in the winter \"because I don't go walking\"     Heartburn, hiatal hernia: She is currently on Prevacid 30 mg daily. Sometimes also takes tagamet. She did have an EGD and colonoscopy 2017 (one polyp- will follow up in 3 years). Strong gag reflex and woke during this procedure.      Viral triggered asthma: Flovent and albuterol when she gets sick with a cold. Had a cold in Jan that was COVID neg on 3 home tests. She did 40mg of prednisone for 5 days; and renewed her flovent and this is helping her again now.    Otherwise not using anything on a regular basis.  She has not had formal lung function testing in a long time but suspects greater than 20 years ago. She was hospitalized for bronchitis in 3rd grade for 2 weeks.     She had a partial hysterectomy for menorrhagia s/t fibroids, this was removed ~2017. This was also when she had cystocele repair.   She is getting paps done with her Partner's ob/gyn still as her cervix is still present.      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    , they have 3 daughters.  1 daughter, the fiancé and grandchild (1-year-old) is currently living with Gualberto.    She enjoys walking 3 times per week, gardening, mowing the lawn for physical activit y in the summer.  Occasional alcohol twice weekly, no smoking.  Mary Winn MD         Medical History:  Patient Active Problem List   Diagnosis     Basal Cell Carcinoma Of The Skin     Gastroesophageal reflux disease without esophagitis     Vitiligo     Essential (primary) hypertension     Perimenopause     Subclinical hypothyroidism     S/P partial hysterectomy     Anxiety     Mild major depression (H)     Benign neoplasm of sigmoid colon     Family history of polyps in the colon     Hiatal hernia     Diverticular disease of large intestine     BRCA negative- also had other negative genetic w/u for familial cancer syndromes     Mild intermittent asthma without complication     Pain in joint     Unspecified iridocyclitis     Past Medical History:   Diagnosis Date     Depression      Elevated hemoglobin (H)      Hypertension      PONV (postoperative nausea and vomiting)      Prolapse of vaginal wall     Created by Conversion  Replacement Utility updated for latest IMO load     Uterine fibroid      Past Surgical History:   Procedure Laterality Date     ABLATION SAPHENOUS VEIN W/ RFA       HC REDUCTION OF LARGE BREAST      Description: Breast Surgery Reduction Procedure Elective;  Proc Date: 08/01/2009;     HC REMOVE TONSILS/ADENOIDS,<11 Y/O      Description: Tonsillectomy With Adenoidectomy;  Recorded: 03/23/2009;     HYSTERECTOMY  2015     MAMMOPLASTY REDUCTION  2007     OR LAP,DIAGNOSTIC ABDOMEN      Description: Laparoscopy (Diagnostic);  Recorded: 09/03/2009;  Comments: for infertility     OR VAGINAL HYSTERECTOMY,UTERUS 250 GMS/< N/A 10/20/2015    Procedure: HYSTERECTOMY VAGINAL;  Surgeon: Dominique Ngo MD;  Location:  [facility] Main OR;  Service: Gynecology     WISDOM TOOTH EXTRACTION       ZZC LIGATE FALLOPIAN TUBE      Description: Tubal Ligation;  Recorded: 03/23/2009;     Vicryl sutures [suture]  Family History   Problem Relation Age of Onset     Cancer Father         thyroid     Prostate Cancer Father      Idiopathic pulmonary fibrosis Father      No Known Problems Brother      Other - See Comments Mother         ARDS (acute respiratory distress syndrome)     Pancreatitis Mother         exploratory laparotomy revealed absence of pancreas, possibly autoimmune     Breast Cancer Paternal Grandmother 35.00     Colon Polyps Daughter         pre CA- 22       Medications:  Current Outpatient Medications   Medication     atenolol (TENORMIN) 25 MG tablet     doxepin (SINEQUAN) 10 MG capsule     escitalopram (LEXAPRO) 10 MG tablet     lansoprazole (PREVACID) 30 MG capsule     levothyroxine (SYNTHROID/LEVOTHROID) 50 MCG tablet     spironolactone (ALDACTONE) 100 MG tablet     albuterol (PROAIR HFA;PROVENTIL HFA;VENTOLIN HFA) 90 mcg/actuation inhaler     FLOVENT  MCG/ACT inhaler     No current facility-administered medications for this visit.         Imaging & Labs reviewed this visit: none      Objective:      Vitals:    02/18/22 1056   BP: 112/72   Pulse: 64   Weight: 98.9 kg (218 lb)       Physical Exam:     General: Alert, no acute distress.   HEENT: normocephalic conjunctivae are clear, Normal pearly TMs bilaterally without erythema, pus or fluid. Position and landmarks are normal.  Nose is clear.  Oropharynx is moist and clear, without tonsillar hypertrophy, asymmetry, exudate or lesions.   Neck: supple without adenopathy or thyromegaly.  Lungs: Good aeration bilaterally. Clear to auscultation without wheezes, rales or rhonci.   Heart: regular rate and rhythm, normal S1 and S2, no murmurs  Abdomen: soft and nontender  Skin: clear without rash or lesions  Neuro: alert, interactive moving all extremities equally, normal  muscle tone in all 4 extremities    Mary Winn MD          Answers for HPI/ROS submitted by the patient on 2/17/2022  How many servings of fruits and vegetables do you eat daily?: 2-3  On average, how many sweetened beverages do you drink each day (Examples: soda, juice, sweet tea, etc.  Do NOT count diet or artificially sweetened beverages)?: 0  How many minutes a day do you exercise enough to make your heart beat faster?: 20 to 29  How many days a week do you exercise enough to make your heart beat faster?: 3 or less  How many days per week do you miss taking your medication?: 0  What is the reason for your visit today?: Follow up and med check

## 2022-02-19 ASSESSMENT — ANXIETY QUESTIONNAIRES: GAD7 TOTAL SCORE: 1

## 2022-03-01 DIAGNOSIS — F51.01 PRIMARY INSOMNIA: ICD-10-CM

## 2022-03-01 DIAGNOSIS — I10 ESSENTIAL HYPERTENSION: ICD-10-CM

## 2022-03-02 RX ORDER — ATENOLOL 25 MG/1
TABLET ORAL
Qty: 30 TABLET | Refills: 4 | Status: SHIPPED | OUTPATIENT
Start: 2022-03-02 | End: 2022-07-25

## 2022-03-02 RX ORDER — DOXEPIN HYDROCHLORIDE 10 MG/1
CAPSULE ORAL
Qty: 30 CAPSULE | Refills: 4 | Status: SHIPPED | OUTPATIENT
Start: 2022-03-02 | End: 2022-07-25

## 2022-03-02 NOTE — TELEPHONE ENCOUNTER
"Last Written Prescription Date:  2/1/22  Last Fill Quantity: 30,  # refills: 0   Last office visit provider:  2/18/22     Requested Prescriptions   Pending Prescriptions Disp Refills     doxepin (SINEQUAN) 10 MG capsule [Pharmacy Med Name: DOXEPIN 10 MG CAPSULE] 30 capsule 0     Sig: TAKE 1 CAPSULE BY MOUTH EVERYDAY AT BEDTIME       Tricyclic Antidepressants Protocol Passed - 3/1/2022 12:33 AM        Passed - Blood pressure under 140/90 in past 12 months     BP Readings from Last 3 Encounters:   02/18/22 112/72   07/14/20 104/78   02/17/20 106/70                 Passed - Recent (12 mo) or future (30 days) visit within the authorizing provider's specialty      Patient has had an office visit with the authorizing provider or a provider within the authorizing providers department within the previous 12 mos or has a future within next 30 days. See \"Patient Info\" tab in inbasket, or \"Choose Columns\" in Meds & Orders section of the refill encounter.              Passed - Medication is active on med list        Passed - Patient is age 18 or older        Passed - No active pregnancy on record        Passed - No positive pregnancy test in past 12 months           atenolol (TENORMIN) 25 MG tablet [Pharmacy Med Name: ATENOLOL 25 MG TABLET] 30 tablet 0     Sig: TAKE 1 TABLET BY MOUTH EVERY DAY       Beta-Blockers Protocol Passed - 3/1/2022 12:33 AM        Passed - Blood pressure under 140/90 in past 12 months     BP Readings from Last 3 Encounters:   02/18/22 112/72   07/14/20 104/78   02/17/20 106/70                 Passed - Patient is age 6 or older        Passed - Recent (12 mo) or future (30 days) visit within the authorizing provider's specialty     Patient has had an office visit with the authorizing provider or a provider within the authorizing providers department within the previous 12 mos or has a future within next 30 days. See \"Patient Info\" tab in inbasket, or \"Choose Columns\" in Meds & Orders section of the refill " encounter.              Passed - Medication is active on med list             Mary Spain RN 03/02/22 3:23 PM

## 2022-03-12 DIAGNOSIS — F41.9 ANXIETY: ICD-10-CM

## 2022-03-12 DIAGNOSIS — F32.0 MILD MAJOR DEPRESSION (H): ICD-10-CM

## 2022-03-12 RX ORDER — ESCITALOPRAM OXALATE 10 MG/1
TABLET ORAL
Qty: 90 TABLET | Refills: 1 | Status: SHIPPED | OUTPATIENT
Start: 2022-03-12 | End: 2022-06-20

## 2022-03-12 NOTE — TELEPHONE ENCOUNTER
"    Last Written Prescription Date: 12/16/21   Last Fill Quantity: 90,  # refills: 0   Last office visit provider:  2/18/22     Requested Prescriptions   Pending Prescriptions Disp Refills     escitalopram (LEXAPRO) 10 MG tablet [Pharmacy Med Name: ESCITALOPRAM 10 MG TABLET] 90 tablet 0     Sig: TAKE 1 TABLET BY MOUTH EVERY DAY       SSRIs Protocol Passed - 3/12/2022 12:18 AM        Passed - PHQ-9 score less than 5 in past 6 months     Please review last PHQ-9 score.           Passed - Medication is active on med list        Passed - Patient is age 18 or older        Passed - No active pregnancy on record        Passed - No positive pregnancy test in last 12 months        Passed - Recent (6 mo) or future (30 days) visit within the authorizing provider's specialty     Patient had office visit in the last 6 months or has a visit in the next 30 days with authorizing provider or within the authorizing provider's specialty.  See \"Patient Info\" tab in inbasket, or \"Choose Columns\" in Meds & Orders section of the refill encounter.                 Marina Holder RN 03/12/22 11:54 AM  "

## 2022-04-06 DIAGNOSIS — E03.8 SUBCLINICAL HYPOTHYROIDISM: ICD-10-CM

## 2022-04-07 RX ORDER — LEVOTHYROXINE SODIUM 50 UG/1
TABLET ORAL
Qty: 90 TABLET | Refills: 3 | Status: SHIPPED | OUTPATIENT
Start: 2022-04-07 | End: 2023-03-03

## 2022-04-07 NOTE — TELEPHONE ENCOUNTER
"Last Written Prescription Date:  1/11/22  Last Fill Quantity: 90,  # refills: 0   Last office visit provider:  2/18/22     Requested Prescriptions   Pending Prescriptions Disp Refills     levothyroxine (SYNTHROID/LEVOTHROID) 50 MCG tablet [Pharmacy Med Name: LEVOTHYROXINE 50 MCG TABLET] 90 tablet 0     Sig: TAKE 1 TABLET BY MOUTH EVERY DAY       Thyroid Protocol Passed - 4/6/2022 12:29 AM        Passed - Patient is 12 years or older        Passed - Recent (12 mo) or future (30 days) visit within the authorizing provider's specialty     Patient has had an office visit with the authorizing provider or a provider within the authorizing providers department within the previous 12 mos or has a future within next 30 days. See \"Patient Info\" tab in inbasket, or \"Choose Columns\" in Meds & Orders section of the refill encounter.              Passed - Medication is active on med list        Passed - Normal TSH on file in past 12 months     Recent Labs   Lab Test 02/18/22  1142   TSH 1.85              Passed - No active pregnancy on record     If patient is pregnant or has had a positive pregnancy test, please check TSH.          Passed - No positive pregnancy test in past 12 months     If patient is pregnant or has had a positive pregnancy test, please check TSH.               Marina Holder RN 04/07/22 8:23 AM  "

## 2022-05-07 ENCOUNTER — E-VISIT (OUTPATIENT)
Dept: FAMILY MEDICINE | Facility: CLINIC | Age: 57
End: 2022-05-07
Payer: COMMERCIAL

## 2022-05-07 DIAGNOSIS — J45.901 MILD ASTHMA WITH EXACERBATION, UNSPECIFIED WHETHER PERSISTENT: ICD-10-CM

## 2022-05-07 DIAGNOSIS — J06.9 VIRAL URI WITH COUGH: ICD-10-CM

## 2022-05-07 DIAGNOSIS — J06.9 VIRAL URI: Primary | ICD-10-CM

## 2022-05-07 PROCEDURE — 99421 OL DIG E/M SVC 5-10 MIN: CPT | Performed by: FAMILY MEDICINE

## 2022-05-07 RX ORDER — PREDNISONE 20 MG/1
40 TABLET ORAL DAILY
Qty: 10 TABLET | Refills: 0 | Status: SHIPPED | OUTPATIENT
Start: 2022-05-07 | End: 2022-05-12

## 2022-05-07 NOTE — PATIENT INSTRUCTIONS
Dear Gualberto Saunders    We can use steroids for an asthma exacerbation due to a viral infection. I sent this in. Glad to hear the covid tests are normal. For sure follow up with your clnic  if the symptoms are not getting or worsening.     Thanks for choosing us as your health care partner,    Denis Ludwig MD

## 2022-06-20 DIAGNOSIS — F32.0 MILD MAJOR DEPRESSION (H): ICD-10-CM

## 2022-06-20 DIAGNOSIS — F41.9 ANXIETY: ICD-10-CM

## 2022-06-20 RX ORDER — ESCITALOPRAM OXALATE 10 MG/1
10 TABLET ORAL DAILY
Qty: 90 TABLET | Refills: 1 | Status: SHIPPED | OUTPATIENT
Start: 2022-06-20 | End: 2022-12-24

## 2022-06-20 NOTE — TELEPHONE ENCOUNTER
"Fax received from pharmacy regarding Escitalopram 10mg tablet, states    \"Patient requests new RX:  Syncing pt's prescriptions for 1 pickup date 7/2022.  Pt had to use partial refill of escitalopram to meet that date.  Can we get a new rx for 90 days to complete the sync for her?\"  "

## 2022-07-25 DIAGNOSIS — I10 ESSENTIAL HYPERTENSION: ICD-10-CM

## 2022-07-25 DIAGNOSIS — F51.01 PRIMARY INSOMNIA: ICD-10-CM

## 2022-07-25 RX ORDER — ATENOLOL 25 MG/1
25 TABLET ORAL DAILY
Qty: 90 TABLET | Refills: 2 | Status: SHIPPED | OUTPATIENT
Start: 2022-07-25 | End: 2023-03-03

## 2022-07-25 RX ORDER — DOXEPIN HYDROCHLORIDE 10 MG/1
CAPSULE ORAL
Qty: 90 CAPSULE | Refills: 2 | Status: SHIPPED | OUTPATIENT
Start: 2022-07-25 | End: 2024-03-01

## 2022-07-25 NOTE — TELEPHONE ENCOUNTER
"Last Written Prescription Date:  3/2/22  Last Fill Quantity: 30,  # refills: 4   Last office visit provider:  2/18/22     Requested Prescriptions   Pending Prescriptions Disp Refills     atenolol (TENORMIN) 25 MG tablet [Pharmacy Med Name: ATENOLOL 25 MG TABLET] 30 tablet 4     Sig: TAKE 1 TABLET BY MOUTH EVERY DAY       Beta-Blockers Protocol Passed - 7/25/2022  8:19 AM        Passed - Blood pressure under 140/90 in past 12 months     BP Readings from Last 3 Encounters:   02/18/22 112/72   07/14/20 104/78   02/17/20 106/70                 Passed - Patient is age 6 or older        Passed - Recent (12 mo) or future (30 days) visit within the authorizing provider's specialty     Patient has had an office visit with the authorizing provider or a provider within the authorizing providers department within the previous 12 mos or has a future within next 30 days. See \"Patient Info\" tab in inbasket, or \"Choose Columns\" in Meds & Orders section of the refill encounter.              Passed - Medication is active on med list           doxepin (SINEQUAN) 10 MG capsule [Pharmacy Med Name: DOXEPIN 10 MG CAPSULE] 30 capsule 4     Sig: TAKE 1 CAPSULE BY MOUTH EVERYDAY AT BEDTIME       Tricyclic Antidepressants Protocol Passed - 7/25/2022 12:33 AM        Passed - Blood pressure under 140/90 in past 12 months     BP Readings from Last 3 Encounters:   02/18/22 112/72   07/14/20 104/78   02/17/20 106/70                 Passed - Recent (12 mo) or future (30 days) visit within the authorizing provider's specialty      Patient has had an office visit with the authorizing provider or a provider within the authorizing providers department within the previous 12 mos or has a future within next 30 days. See \"Patient Info\" tab in inbasket, or \"Choose Columns\" in Meds & Orders section of the refill encounter.              Passed - Medication is active on med list        Passed - Patient is age 18 or older        Passed - No active pregnancy on " record        Passed - No positive pregnancy test in past 12 months             Sage Coyne RN 07/25/22 8:19 AM

## 2022-09-19 ENCOUNTER — TRANSFERRED RECORDS (OUTPATIENT)
Dept: HEALTH INFORMATION MANAGEMENT | Facility: CLINIC | Age: 57
End: 2022-09-19

## 2022-09-24 ENCOUNTER — HEALTH MAINTENANCE LETTER (OUTPATIENT)
Age: 57
End: 2022-09-24

## 2022-09-29 ENCOUNTER — E-VISIT (OUTPATIENT)
Dept: URGENT CARE | Facility: CLINIC | Age: 57
End: 2022-09-29
Payer: COMMERCIAL

## 2022-09-29 DIAGNOSIS — N39.0 ACUTE UTI (URINARY TRACT INFECTION): Primary | ICD-10-CM

## 2022-09-29 PROCEDURE — 99421 OL DIG E/M SVC 5-10 MIN: CPT | Performed by: FAMILY MEDICINE

## 2022-09-29 RX ORDER — NITROFURANTOIN 25; 75 MG/1; MG/1
100 CAPSULE ORAL 2 TIMES DAILY
Qty: 10 CAPSULE | Refills: 0 | Status: SHIPPED | OUTPATIENT
Start: 2022-09-29 | End: 2022-10-04

## 2022-09-29 NOTE — PATIENT INSTRUCTIONS
Dear Gualberto Saunders    After reviewing your responses, I've been able to diagnose you with a urinary tract infection, which is a common infection of the bladder with bacteria.  This is not a sexually transmitted infection, though urinating immediately after intercourse can help prevent infections.  Drinking lots of fluids is also helpful to clear your current infection and prevent the next one.      I have sent a prescription for antibiotics to your pharmacy to treat this infection.    It is important that you take all of your prescribed medication even if your symptoms are improving after a few doses.  Taking all of your medicine helps prevent the symptoms from returning.     If your symptoms worsen, you develop pain in your back or stomach, develop fevers, or are not improving in 5 days, please contact your primary care provider for an appointment or visit any of our convenient Walk-in or Urgent Care Centers to be seen, which can be found on our website here.    Thanks again for choosing us as your health care partner,    Suzi Garcia MD    Urinary Tract Infections in Women  Urinary tract infections (UTIs) are most often caused by bacteria. These bacteria enter the urinary tract. The bacteria may come from inside the body. Or they may travel from the skin outside the rectum or vagina into the urethra. Female anatomy makes it easy for bacteria from the bowel to enter a woman s urinary tract, which is the most common source of UTI. This means women develop UTIs more often than men. Pain in or around the urinary tract is a common UTI symptom. But the only way to know for sure if you have a UTI for the healthcare provider to test your urine. The two tests that may be done are the urinalysis and urine culture.     Types of UTIs    Cystitis. A bladder infection (cystitis) is the most common UTI in women. You may have urgent or frequent need to pee. You may also have pain, burning when you pee, and bloody  urine.    Urethritis. This is an inflamed urethra, which is the tube that carries urine from the bladder to outside the body. You may have lower stomach or back pain. You may also have urgent or frequent need to pee.    Pyelonephritis. This is a kidney infection. If not treated, it can be serious and damage your kidneys. In severe cases, you may need to stay in the hospital. You may have a fever and lower back pain.    Medicines to treat a UTI  Most UTIs are treated with antibiotics. These kill the bacteria. The length of time you need to take them depends on the type of infection. It may be as short as 3 days. If you have repeated UTIs, you may need a low-dose antibiotic for several months. Take antibiotics exactly as directed. Don t stop taking them until all of the medicine is gone. If you stop taking the antibiotic too soon, the infection may not go away. You may also develop a resistance to the antibiotic. This can make it much harder to treat.   Lifestyle changes to treat and prevent UTIs   The lifestyle changes below will help get rid of your UTI. They may also help prevent future UTIs.     Drink plenty of fluids. This includes water, juice, or other caffeine-free drinks. Fluids help flush bacteria out of your body.    Empty your bladder. Always empty your bladder when you feel the urge to pee. And always pee before going to sleep. Urine that stays in your bladder can lead to infection. Try to pee before and after sex as well.    Practice good personal hygiene. Wipe yourself from front to back after using the toilet. This helps keep bacteria from getting into the urethra.    Use condoms during sex. These help prevent UTIs caused by sexually transmitted bacteria. Also don't use spermicides during sex. These can increase the risk for UTIs. Choose other forms of birth control instead. For women who tend to get UTIs after sex, a low-dose of a preventive antibiotic may be used. Be sure to discuss this option with  your healthcare provider.    Follow up with your healthcare provider as directed. He or she may test to make sure the infection has cleared. If needed, more treatment may be started.  Tianna last reviewed this educational content on 7/1/2019 2000-2021 [employer]. All rights reserved. This information is not intended as a substitute for professional medical care. Always follow your healthcare professional's instructions.

## 2022-12-23 DIAGNOSIS — F32.0 MILD MAJOR DEPRESSION (H): ICD-10-CM

## 2022-12-23 DIAGNOSIS — F41.9 ANXIETY: ICD-10-CM

## 2022-12-24 NOTE — TELEPHONE ENCOUNTER
"Routing refill request to provider for review/approval because:  Patient needs to be seen because it has been more than 6 months since last office visit.  PHQ9 out of date     Last Written Prescription Date:  6/20/22  Last Fill Quantity: 90,  # refills: 1   Last office visit provider:  2/18/22     Requested Prescriptions   Pending Prescriptions Disp Refills     escitalopram (LEXAPRO) 10 MG tablet [Pharmacy Med Name: ESCITALOPRAM 10 MG TABLET] 90 tablet 1     Sig: TAKE 1 TABLET (10 MG) BY MOUTH DAILY.       SSRIs Protocol Failed - 12/23/2022 12:37 AM        Failed - PHQ-9 score less than 5 in past 6 months     Please review last PHQ-9 score.           Failed - Recent (6 mo) or future (30 days) visit within the authorizing provider's specialty     Patient had office visit in the last 6 months or has a visit in the next 30 days with authorizing provider or within the authorizing provider's specialty.  See \"Patient Info\" tab in inbasket, or \"Choose Columns\" in Meds & Orders section of the refill encounter.            Passed - Medication is active on med list        Passed - Patient is age 18 or older        Passed - No active pregnancy on record        Passed - No positive pregnancy test in last 12 months             Jenelle Mcmahon RN 12/24/22 6:40 AM  "

## 2022-12-26 RX ORDER — ESCITALOPRAM OXALATE 10 MG/1
10 TABLET ORAL DAILY
Qty: 90 TABLET | Refills: 0 | Status: SHIPPED | OUTPATIENT
Start: 2022-12-26 | End: 2023-03-03

## 2023-03-02 ASSESSMENT — ANXIETY QUESTIONNAIRES
6. BECOMING EASILY ANNOYED OR IRRITABLE: NOT AT ALL
4. TROUBLE RELAXING: NOT AT ALL
7. FEELING AFRAID AS IF SOMETHING AWFUL MIGHT HAPPEN: NOT AT ALL
8. IF YOU CHECKED OFF ANY PROBLEMS, HOW DIFFICULT HAVE THESE MADE IT FOR YOU TO DO YOUR WORK, TAKE CARE OF THINGS AT HOME, OR GET ALONG WITH OTHER PEOPLE?: NOT DIFFICULT AT ALL
1. FEELING NERVOUS, ANXIOUS, OR ON EDGE: NOT AT ALL
GAD7 TOTAL SCORE: 0
7. FEELING AFRAID AS IF SOMETHING AWFUL MIGHT HAPPEN: NOT AT ALL
2. NOT BEING ABLE TO STOP OR CONTROL WORRYING: NOT AT ALL
5. BEING SO RESTLESS THAT IT IS HARD TO SIT STILL: NOT AT ALL
3. WORRYING TOO MUCH ABOUT DIFFERENT THINGS: NOT AT ALL
IF YOU CHECKED OFF ANY PROBLEMS ON THIS QUESTIONNAIRE, HOW DIFFICULT HAVE THESE PROBLEMS MADE IT FOR YOU TO DO YOUR WORK, TAKE CARE OF THINGS AT HOME, OR GET ALONG WITH OTHER PEOPLE: NOT DIFFICULT AT ALL
GAD7 TOTAL SCORE: 0

## 2023-03-02 ASSESSMENT — ASTHMA QUESTIONNAIRES
QUESTION_3 LAST FOUR WEEKS HOW OFTEN DID YOUR ASTHMA SYMPTOMS (WHEEZING, COUGHING, SHORTNESS OF BREATH, CHEST TIGHTNESS OR PAIN) WAKE YOU UP AT NIGHT OR EARLIER THAN USUAL IN THE MORNING: ONCE OR TWICE
QUESTION_1 LAST FOUR WEEKS HOW MUCH OF THE TIME DID YOUR ASTHMA KEEP YOU FROM GETTING AS MUCH DONE AT WORK, SCHOOL OR AT HOME: NONE OF THE TIME
QUESTION_2 LAST FOUR WEEKS HOW OFTEN HAVE YOU HAD SHORTNESS OF BREATH: NOT AT ALL
QUESTION_5 LAST FOUR WEEKS HOW WOULD YOU RATE YOUR ASTHMA CONTROL: COMPLETELY CONTROLLED
QUESTION_4 LAST FOUR WEEKS HOW OFTEN HAVE YOU USED YOUR RESCUE INHALER OR NEBULIZER MEDICATION (SUCH AS ALBUTEROL): NOT AT ALL
ACT_TOTALSCORE: 24
ACT_TOTALSCORE: 24

## 2023-03-03 ENCOUNTER — OFFICE VISIT (OUTPATIENT)
Dept: FAMILY MEDICINE | Facility: CLINIC | Age: 58
End: 2023-03-03
Payer: COMMERCIAL

## 2023-03-03 VITALS — HEART RATE: 65 BPM | SYSTOLIC BLOOD PRESSURE: 120 MMHG | OXYGEN SATURATION: 96 % | DIASTOLIC BLOOD PRESSURE: 82 MMHG

## 2023-03-03 DIAGNOSIS — F32.0 MILD MAJOR DEPRESSION (H): ICD-10-CM

## 2023-03-03 DIAGNOSIS — E03.8 SUBCLINICAL HYPOTHYROIDISM: ICD-10-CM

## 2023-03-03 DIAGNOSIS — F51.01 PRIMARY INSOMNIA: ICD-10-CM

## 2023-03-03 DIAGNOSIS — Z12.31 VISIT FOR SCREENING MAMMOGRAM: ICD-10-CM

## 2023-03-03 DIAGNOSIS — J45.20 MILD INTERMITTENT ASTHMA WITHOUT COMPLICATION: ICD-10-CM

## 2023-03-03 DIAGNOSIS — K21.9 GASTROESOPHAGEAL REFLUX DISEASE WITHOUT ESOPHAGITIS: ICD-10-CM

## 2023-03-03 DIAGNOSIS — Z13.71 BRCA NEGATIVE: ICD-10-CM

## 2023-03-03 DIAGNOSIS — F41.9 ANXIETY: ICD-10-CM

## 2023-03-03 DIAGNOSIS — I10 ESSENTIAL HYPERTENSION: Primary | ICD-10-CM

## 2023-03-03 LAB
ANION GAP SERPL CALCULATED.3IONS-SCNC: 12 MMOL/L (ref 7–15)
BUN SERPL-MCNC: 10.1 MG/DL (ref 6–20)
CALCIUM SERPL-MCNC: 10.3 MG/DL (ref 8.6–10)
CHLORIDE SERPL-SCNC: 103 MMOL/L (ref 98–107)
CHOLEST SERPL-MCNC: 214 MG/DL
CREAT SERPL-MCNC: 0.64 MG/DL (ref 0.51–0.95)
DEPRECATED HCO3 PLAS-SCNC: 26 MMOL/L (ref 22–29)
ERYTHROCYTE [DISTWIDTH] IN BLOOD BY AUTOMATED COUNT: 12 % (ref 10–15)
GFR SERPL CREATININE-BSD FRML MDRD: >90 ML/MIN/1.73M2
GLUCOSE SERPL-MCNC: 117 MG/DL (ref 70–99)
HBA1C MFR BLD: 6.1 % (ref 0–5.6)
HCT VFR BLD AUTO: 46.5 % (ref 35–47)
HDLC SERPL-MCNC: 43 MG/DL
HGB BLD-MCNC: 16.4 G/DL (ref 11.7–15.7)
LDLC SERPL CALC-MCNC: 118 MG/DL
MCH RBC QN AUTO: 34.7 PG (ref 26.5–33)
MCHC RBC AUTO-ENTMCNC: 35.3 G/DL (ref 31.5–36.5)
MCV RBC AUTO: 98 FL (ref 78–100)
NONHDLC SERPL-MCNC: 171 MG/DL
PLATELET # BLD AUTO: 219 10E3/UL (ref 150–450)
POTASSIUM SERPL-SCNC: 4.4 MMOL/L (ref 3.4–5.3)
RBC # BLD AUTO: 4.73 10E6/UL (ref 3.8–5.2)
SODIUM SERPL-SCNC: 141 MMOL/L (ref 136–145)
TRIGL SERPL-MCNC: 266 MG/DL
TSH SERPL DL<=0.005 MIU/L-ACNC: 4.5 UIU/ML (ref 0.3–4.2)
WBC # BLD AUTO: 8.5 10E3/UL (ref 4–11)

## 2023-03-03 PROCEDURE — 84443 ASSAY THYROID STIM HORMONE: CPT | Performed by: FAMILY MEDICINE

## 2023-03-03 PROCEDURE — 99214 OFFICE O/P EST MOD 30 MIN: CPT | Performed by: FAMILY MEDICINE

## 2023-03-03 PROCEDURE — 36415 COLL VENOUS BLD VENIPUNCTURE: CPT | Performed by: FAMILY MEDICINE

## 2023-03-03 PROCEDURE — 80048 BASIC METABOLIC PNL TOTAL CA: CPT | Performed by: FAMILY MEDICINE

## 2023-03-03 PROCEDURE — 85027 COMPLETE CBC AUTOMATED: CPT | Performed by: FAMILY MEDICINE

## 2023-03-03 PROCEDURE — 83036 HEMOGLOBIN GLYCOSYLATED A1C: CPT | Performed by: FAMILY MEDICINE

## 2023-03-03 PROCEDURE — 80061 LIPID PANEL: CPT | Performed by: FAMILY MEDICINE

## 2023-03-03 RX ORDER — ATENOLOL 25 MG/1
25 TABLET ORAL DAILY
Qty: 90 TABLET | Refills: 2 | Status: SHIPPED | OUTPATIENT
Start: 2023-03-03 | End: 2023-12-22

## 2023-03-03 RX ORDER — LEVOTHYROXINE SODIUM 50 UG/1
50 TABLET ORAL DAILY
Qty: 90 TABLET | Refills: 3 | Status: SHIPPED | OUTPATIENT
Start: 2023-03-03 | End: 2024-01-19

## 2023-03-03 RX ORDER — ESCITALOPRAM OXALATE 10 MG/1
10 TABLET ORAL DAILY
Qty: 90 TABLET | Refills: 2 | Status: SHIPPED | OUTPATIENT
Start: 2023-03-03 | End: 2023-03-29

## 2023-03-03 RX ORDER — DOXEPIN HYDROCHLORIDE 10 MG/1
10 CAPSULE ORAL AT BEDTIME
Qty: 90 CAPSULE | Refills: 2 | Status: CANCELLED | OUTPATIENT
Start: 2023-03-03

## 2023-03-03 RX ORDER — DOXEPIN HYDROCHLORIDE 25 MG/1
25 CAPSULE ORAL AT BEDTIME
Qty: 90 CAPSULE | Refills: 3 | Status: SHIPPED | OUTPATIENT
Start: 2023-03-03 | End: 2024-01-19

## 2023-03-03 ASSESSMENT — PATIENT HEALTH QUESTIONNAIRE - PHQ9
10. IF YOU CHECKED OFF ANY PROBLEMS, HOW DIFFICULT HAVE THESE PROBLEMS MADE IT FOR YOU TO DO YOUR WORK, TAKE CARE OF THINGS AT HOME, OR GET ALONG WITH OTHER PEOPLE: NOT DIFFICULT AT ALL
SUM OF ALL RESPONSES TO PHQ QUESTIONS 1-9: 2
SUM OF ALL RESPONSES TO PHQ QUESTIONS 1-9: 2

## 2023-03-03 ASSESSMENT — ANXIETY QUESTIONNAIRES: GAD7 TOTAL SCORE: 0

## 2023-03-03 NOTE — PATIENT INSTRUCTIONS
There is a new shingles vaccine that is 97% effective. It is the Shingrix vaccine and is recommended for those 50 and older. We recommend the vaccine even if you have had shingles or the older vaccine against shingles- Zostavax. Insurance coverage for the vaccine varies. I recommend you check with your insurance to verify if, when and where it is covered. The vaccine is covered by most commercial insurance but Medicare does not cover the vaccine. It may be covered by Medicare Part D (your drug/pharmacy plan) and sometimes it is covered only at a pharmacy instead of here in the clinic. If it is covered in the clinic, you may schedule a nurse visit anytime to get the first dose of the vaccine. The second dose is recommended two months after the first and should be gotten by 6 months after the first.   About 10% of people will get a sore, red reaction at the site of the injection. Some people may also feel a little sick or nauseated after the injection. This may happen with either the first and/or second vaccine.      Thank you for discussing your sleep concerns in clinic today. In order to improve your sleep, I recommend:     --No pets in the bedroom  --No caffeine consumption after 4 p.m.  --Keep bedroom cool and conducive to sleep  --No watching the bedroom clock  --No nicotine use, especially in the evening  --No exercising within 2 to 3 hours before bedtime    Avoid excessive stimulation by doing the following:  --Go to bed only when sleepy  --Use the bedroom only for sleep and sex  --Go to another room if unable to fall asleep within 15 to 20 minutes  --Read or engage in other quiet activities and return to bed only when sleepy    It may be helpful to keep a sleep journal and record:  --How long it took to fall asleep the previous night  --Whether you took any sleep aid  --How many times you woke up during the night  --What time you woke up in the morning  --How restful you felt your sleep was the previous  night  --When and if you took naps during the day    Calming options:  - Lavender essential oil  - Eye mask   - Weighted blanket  - Mindfulness sleep apps- Calm, Smiling Minds for guided sleep meditations    Therapy for sleep    - Melatonin 1mg 1-2 hours before bed

## 2023-03-03 NOTE — PROGRESS NOTES
Assessment & Plan     Essential hypertension  Blood pressure well controlled.  Pt has been on a beta-blocker which would not necessarily be first-line treatment without other indication for this.  At this point given the rare experience she has with asthma issues, okay to continue, but low threshold to change to this in the future.  Potentially helping somewhat with her anxiety.    She also is due for a physical/labs and would like to do the labs today as she is due for BMP at minimum for BP med monitoring.   - atenolol (TENORMIN) 25 MG tablet; Take 1 tablet (25 mg) by mouth daily  - Hemoglobin A1c  - Lipid panel reflex to direct LDL Non-fasting  - Basic metabolic panel  - CBC with platelets    Anxiety  Depression  Doing well with current SSRI.   Exercise and mindfulness reviewed.  Declines option for therapy.  -     Continue escitalopram oxalate (LEXAPRO) 10 MG tablet; Take 1 tablet (10 mg total) by mouth daily.    Primary insomnia  Option to trial slightly higher dose in setting of insomnia/depression indications. Reviewed side effect profile and will go back to 10mg dose if not effective. Of note, we spend the majority of this topic discussing importance of sleep hygeine and options for sleep therapy /CBT and other mindfulness resources.  - doxepin (SINEQUAN) 25 MG capsule; Take 1 capsule (25 mg) by mouth At Bedtime    Subclinical hypothyroidism  Was started on Synthroid 7/2020 for TSH of 8 with normal free T4 after a risk/benefit conversation.   TSH today returned just above 4; can consider a small dose increase to 75mcg daily and TSH in 6 weeks recheck or just monitor again with recheck in 6 weeks at current 50mcg dose.   - levothyroxine (SYNTHROID/LEVOTHROID) 50 MCG tablet; Take 1 tablet (50 mcg) by mouth daily  - TSH; Future  - TSH    Mild intermittent asthma without complication  Rare flares, 1X per year during viral triggered event.  PRN albuterol available as well as the Flovent for this time periods-  encouraged her to restart this given the evening cough recently this winter and see how that helps for the remainder of the season. ACT score normal.     BRCA negative- also had other negative genetic w/u for familial cancer syndromes     Gastroesophageal reflux disease without esophagitis  Hiatal hernia  Continues on PPI, risks of this were reviewed and options for cimetidine or famotidine were reviewed      Visit for screening mammogram  - MA SCREENING DIGITAL BILAT - Future  (s+30); Future          Return in about 3 months (around 6/3/2023) for Annual physical.    Mary Winn MD  [facility]    Sean Burciaga is a 57 year old, presenting for the following health issues:  Recheck Medication (Waking up in the middle of the night coughing- asthma related?, doxipin is not working for sleep)     Here for a med check:     Depression/anxiety: She is on Lexapro 10 mg daily for a few years. overall doing well.      Insomnia: She takes doxepin 10 mg at bedtime to help with her sleep. Some nights ok and some nights not as great and takes a unisom in addition. Sometimes wakes at 3am.   Snores only when she is extremely tired. Doesn't wake up exhausted.      She has trouble with sleep- can fall asleep and wake at 3am or can take awhile to fall asleep until 1am some nights too.   Not having night sweats anymore ( hx of hysterectomy)  On doxepin 10mg  (and recently adding unisom)  Doesn't snack after 6pm dinner  Trying to do a massage every 6 weeks    Hypertension: She is on 25 mg atenolol daily for this. Checks BPs at work, no concerns.  No lightheadedness, dizziness, chest pain or palpitations.  No lower extremity swelling.    Heartburn, hiatal hernia: She is currently on Prevacid 30 mg daily. Sometimes also takes tagamet.  (Lansoprazole); she doesn't attribute her nighttime cough to any heartburn/reflux . Quit drinking soda.  Doing [place] instead   She did have an EGD and  colonoscopy 2017 (one polyp- will follow up in 3 years). Strong gag reflex and woke during this procedure.      Viral triggered asthma:   Asthma seems to bother her more at night. She hasn't been taking her flovent. Was only using during periods of bronchitis.  She has not had formal lung function testing in a long time but suspects greater than 20 years ago. She was hospitalized for bronchitis in 3rd grade for 2 weeks.     She had a partial hysterectomy for menorrhagia s/t fibroids, this was removed ~2017. This was also when she had cystocele repair.   She is getting paps done with her Partner's ob/gyn still as her cervix is still present.       Wt Readings from Last 3 Encounters:   02/18/22 98.9 kg (218 lb)   07/14/20 97.7 kg (215 lb 6.4 oz)   02/17/20 97.5 kg (215 lb)         History of Present Illness       Reason for visit:  Med check follow up    She eats 2-3 servings of fruits and vegetables daily.She consumes 0 sweetened beverage(s) daily.She exercises with enough effort to increase her heart rate 10 to 19 minutes per day.  She exercises with enough effort to increase her heart rate 3 or less days per week.   She is taking medications regularly.  Today's ELIJAH-7 Score: 0             Review of Systems   Constitutional, HEENT, cardiovascular, pulmonary, gi and gu systems are negative, except as otherwise noted.      Objective    /82 (BP Location: Left arm, Patient Position: Sitting, Cuff Size: Adult Regular)   Pulse 65   SpO2 96%   There is no height or weight on file to calculate BMI.  Physical Exam   GENERAL: healthy, alert and no distress  RESP: lungs clear to auscultation - no rales, rhonchi or wheezes  CV: regular rate and rhythm, normal S1 S2, no S3 or S4, no murmur, click or rub, no peripheral edema and peripheral pulses strong  ABDOMEN: soft  MS: no gross musculoskeletal defects noted, no edema

## 2023-03-05 ENCOUNTER — MYC MEDICAL ADVICE (OUTPATIENT)
Dept: FAMILY MEDICINE | Facility: CLINIC | Age: 58
End: 2023-03-05
Payer: COMMERCIAL

## 2023-03-05 DIAGNOSIS — E03.8 SUBCLINICAL HYPOTHYROIDISM: Primary | ICD-10-CM

## 2023-03-06 RX ORDER — LEVOTHYROXINE SODIUM 75 UG/1
75 TABLET ORAL DAILY
Qty: 90 TABLET | Refills: 0 | Status: SHIPPED | OUTPATIENT
Start: 2023-03-06 | End: 2023-07-03

## 2023-03-06 NOTE — TELEPHONE ENCOUNTER
See MyChart from Patient needing PCP review.  Please respond directly to patient, if at all able.    KYLEE Burns  [facility]

## 2023-03-27 DIAGNOSIS — E03.8 SUBCLINICAL HYPOTHYROIDISM: ICD-10-CM

## 2023-03-27 RX ORDER — LEVOTHYROXINE SODIUM 50 UG/1
TABLET ORAL
Qty: 90 TABLET | Refills: 3 | OUTPATIENT
Start: 2023-03-27

## 2023-03-27 NOTE — TELEPHONE ENCOUNTER
"Routing refill request to provider for review/approval because:  Early for refill     Last Written Prescription Date:  03/03/2023  Last Fill Quantity: 90,  # refills: 3   Last office visit provider:  03/03/2023    Requested Prescriptions   Pending Prescriptions Disp Refills     levothyroxine (SYNTHROID/LEVOTHROID) 50 MCG tablet [Pharmacy Med Name: LEVOTHYROXINE 50 MCG TABLET] 90 tablet 3     Sig: TAKE 1 TABLET BY MOUTH EVERY DAY       Thyroid Protocol Failed - 3/27/2023  3:08 PM        Failed - Normal TSH on file in past 12 months     Recent Labs   Lab Test 03/03/23  1504   TSH 4.50*              Passed - Patient is 12 years or older        Passed - Recent (12 mo) or future (30 days) visit within the authorizing provider's specialty     Patient has had an office visit with the authorizing provider or a provider within the authorizing providers department within the previous 12 mos or has a future within next 30 days. See \"Patient Info\" tab in inbasket, or \"Choose Columns\" in Meds & Orders section of the refill encounter.              Passed - Medication is active on med list        Passed - No active pregnancy on record     If patient is pregnant or has had a positive pregnancy test, please check TSH.          Passed - No positive pregnancy test in past 12 months     If patient is pregnant or has had a positive pregnancy test, please check TSH.               Leyla Urbina RN 03/27/23 3:08 PM  "

## 2023-03-28 DIAGNOSIS — F41.9 ANXIETY: ICD-10-CM

## 2023-03-28 DIAGNOSIS — F32.0 MILD MAJOR DEPRESSION (H): ICD-10-CM

## 2023-03-29 RX ORDER — ESCITALOPRAM OXALATE 10 MG/1
TABLET ORAL
Qty: 90 TABLET | Refills: 2 | Status: SHIPPED | OUTPATIENT
Start: 2023-03-29 | End: 2023-12-22

## 2023-03-29 NOTE — TELEPHONE ENCOUNTER
"Last Written Prescription Date:  3/3/2023  Last Fill Quantity: 90,  # refills: 2   Last office visit provider:  3/3/2022 sent to different  Pharmacy per request    Requested Prescriptions   Pending Prescriptions Disp Refills     escitalopram (LEXAPRO) 10 MG tablet [Pharmacy Med Name: ESCITALOPRAM 10 MG TABLET] 90 tablet 2     Sig: TAKE 1 TABLET (10 MG) BY MOUTH DAILY *PLEASE SCHEDULE ANNUAL EXAM FOR FURTHER REFILLS*       SSRIs Protocol Passed - 3/29/2023  8:12 AM        Passed - PHQ-9 score less than 5 in past 6 months     Please review last PHQ-9 score.           Passed - Medication is active on med list        Passed - Patient is age 18 or older        Passed - No active pregnancy on record        Passed - No positive pregnancy test in last 12 months        Passed - Recent (6 mo) or future (30 days) visit within the authorizing provider's specialty     Patient had office visit in the last 6 months or has a visit in the next 30 days with authorizing provider or within the authorizing provider's specialty.  See \"Patient Info\" tab in inbasket, or \"Choose Columns\" in Meds & Orders section of the refill encounter.                 Alexus Eugene RN 03/29/23 8:12 AM  "

## 2023-06-04 ENCOUNTER — HEALTH MAINTENANCE LETTER (OUTPATIENT)
Age: 58
End: 2023-06-04

## 2023-07-01 DIAGNOSIS — E03.8 SUBCLINICAL HYPOTHYROIDISM: ICD-10-CM

## 2023-07-02 NOTE — TELEPHONE ENCOUNTER
"Routing refill request to provider for review/approval because:  Labs out of range:  TSH    Last Written Prescription Date: 3-6-23  Last Fill Quantity: 90,  # refills: 0   Last office visit provider: 3-3-23     Requested Prescriptions   Pending Prescriptions Disp Refills     levothyroxine (SYNTHROID/LEVOTHROID) 75 MCG tablet [Pharmacy Med Name: Levothyroxine Sodium Oral Tablet 75 MCG] 90 tablet 0     Sig: Take 1 tablet (75 mcg) by mouth daily       Thyroid Protocol Failed - 7/1/2023  1:34 PM        Failed - Normal TSH on file in past 12 months     Recent Labs   Lab Test 03/03/23  1504   TSH 4.50*              Passed - Patient is 12 years or older        Passed - Recent (12 mo) or future (30 days) visit within the authorizing provider's specialty     Patient has had an office visit with the authorizing provider or a provider within the authorizing providers department within the previous 12 mos or has a future within next 30 days. See \"Patient Info\" tab in inbasket, or \"Choose Columns\" in Meds & Orders section of the refill encounter.              Passed - Medication is active on med list        Passed - No active pregnancy on record     If patient is pregnant or has had a positive pregnancy test, please check TSH.          Passed - No positive pregnancy test in past 12 months     If patient is pregnant or has had a positive pregnancy test, please check TSH.               Ashley Damian RN 07/01/23 11:01 PM  "

## 2023-07-03 RX ORDER — LEVOTHYROXINE SODIUM 75 UG/1
75 TABLET ORAL DAILY
Qty: 90 TABLET | Refills: 0 | Status: SHIPPED | OUTPATIENT
Start: 2023-07-03 | End: 2023-09-28

## 2023-09-28 ENCOUNTER — MYC REFILL (OUTPATIENT)
Dept: FAMILY MEDICINE | Facility: CLINIC | Age: 58
End: 2023-09-28
Payer: COMMERCIAL

## 2023-09-28 DIAGNOSIS — E03.8 SUBCLINICAL HYPOTHYROIDISM: ICD-10-CM

## 2023-09-28 RX ORDER — LEVOTHYROXINE SODIUM 75 UG/1
75 TABLET ORAL DAILY
Qty: 90 TABLET | Refills: 0 | OUTPATIENT
Start: 2023-09-28

## 2023-09-29 RX ORDER — LEVOTHYROXINE SODIUM 75 UG/1
75 TABLET ORAL DAILY
Qty: 90 TABLET | Refills: 1 | Status: SHIPPED | OUTPATIENT
Start: 2023-09-29 | End: 2024-03-18

## 2023-09-29 NOTE — TELEPHONE ENCOUNTER
Please encourage pt to schedule lab only if she desires prior to her physical in Jan that was scheduled. In March we talked about a 6 week TSH recheck (that order was on file) given the dose increase.

## 2023-10-03 ENCOUNTER — LAB (OUTPATIENT)
Dept: LAB | Facility: CLINIC | Age: 58
End: 2023-10-03
Payer: COMMERCIAL

## 2023-10-03 DIAGNOSIS — E03.8 SUBCLINICAL HYPOTHYROIDISM: ICD-10-CM

## 2023-10-03 DIAGNOSIS — Z11.59 NEED FOR HEPATITIS C SCREENING TEST: Primary | ICD-10-CM

## 2023-10-03 LAB — HCV AB SERPL QL IA: NONREACTIVE

## 2023-10-03 PROCEDURE — 36415 COLL VENOUS BLD VENIPUNCTURE: CPT

## 2023-10-03 PROCEDURE — 84443 ASSAY THYROID STIM HORMONE: CPT

## 2023-10-03 PROCEDURE — 86803 HEPATITIS C AB TEST: CPT

## 2023-10-04 LAB — TSH SERPL DL<=0.005 MIU/L-ACNC: 2.45 UIU/ML (ref 0.3–4.2)

## 2023-10-14 ENCOUNTER — HEALTH MAINTENANCE LETTER (OUTPATIENT)
Age: 58
End: 2023-10-14

## 2023-12-22 DIAGNOSIS — F41.9 ANXIETY: ICD-10-CM

## 2023-12-22 DIAGNOSIS — I10 ESSENTIAL HYPERTENSION: ICD-10-CM

## 2023-12-22 DIAGNOSIS — F32.0 MILD MAJOR DEPRESSION (H): ICD-10-CM

## 2023-12-22 RX ORDER — ATENOLOL 25 MG/1
25 TABLET ORAL DAILY
Qty: 90 TABLET | Refills: 0 | Status: SHIPPED | OUTPATIENT
Start: 2023-12-22 | End: 2024-01-19 | Stop reason: SINTOL

## 2023-12-22 RX ORDER — ESCITALOPRAM OXALATE 10 MG/1
TABLET ORAL
Qty: 90 TABLET | Refills: 0 | Status: SHIPPED | OUTPATIENT
Start: 2023-12-22 | End: 2024-03-22

## 2024-01-19 ENCOUNTER — OFFICE VISIT (OUTPATIENT)
Dept: FAMILY MEDICINE | Facility: CLINIC | Age: 59
End: 2024-01-19
Payer: COMMERCIAL

## 2024-01-19 VITALS
SYSTOLIC BLOOD PRESSURE: 118 MMHG | WEIGHT: 227.44 LBS | DIASTOLIC BLOOD PRESSURE: 80 MMHG | HEIGHT: 69 IN | BODY MASS INDEX: 33.68 KG/M2 | RESPIRATION RATE: 16 BRPM

## 2024-01-19 DIAGNOSIS — N95.1 MENOPAUSAL SYNDROME (HOT FLASHES): ICD-10-CM

## 2024-01-19 DIAGNOSIS — I10 ESSENTIAL HYPERTENSION: ICD-10-CM

## 2024-01-19 DIAGNOSIS — F41.9 ANXIETY: ICD-10-CM

## 2024-01-19 DIAGNOSIS — E03.8 SUBCLINICAL HYPOTHYROIDISM: ICD-10-CM

## 2024-01-19 DIAGNOSIS — R73.03 PREDIABETES: ICD-10-CM

## 2024-01-19 DIAGNOSIS — E66.09 OBESITY DUE TO EXCESS CALORIES WITH SERIOUS COMORBIDITY, UNSPECIFIED CLASSIFICATION: Primary | ICD-10-CM

## 2024-01-19 DIAGNOSIS — F32.0 MILD MAJOR DEPRESSION (H): ICD-10-CM

## 2024-01-19 DIAGNOSIS — J45.20 MILD INTERMITTENT ASTHMA WITHOUT COMPLICATION: ICD-10-CM

## 2024-01-19 DIAGNOSIS — F51.01 PRIMARY INSOMNIA: ICD-10-CM

## 2024-01-19 LAB
ERYTHROCYTE [DISTWIDTH] IN BLOOD BY AUTOMATED COUNT: 12.1 % (ref 10–15)
HBA1C MFR BLD: 6.4 % (ref 0–5.6)
HCT VFR BLD AUTO: 45 % (ref 35–47)
HGB BLD-MCNC: 15.9 G/DL (ref 11.7–15.7)
MCH RBC QN AUTO: 34.7 PG (ref 26.5–33)
MCHC RBC AUTO-ENTMCNC: 35.3 G/DL (ref 31.5–36.5)
MCV RBC AUTO: 98 FL (ref 78–100)
PLATELET # BLD AUTO: 223 10E3/UL (ref 150–450)
RBC # BLD AUTO: 4.58 10E6/UL (ref 3.8–5.2)
WBC # BLD AUTO: 7.3 10E3/UL (ref 4–11)

## 2024-01-19 PROCEDURE — 80048 BASIC METABOLIC PNL TOTAL CA: CPT | Performed by: FAMILY MEDICINE

## 2024-01-19 PROCEDURE — 83036 HEMOGLOBIN GLYCOSYLATED A1C: CPT | Performed by: FAMILY MEDICINE

## 2024-01-19 PROCEDURE — 80061 LIPID PANEL: CPT | Performed by: FAMILY MEDICINE

## 2024-01-19 PROCEDURE — 84443 ASSAY THYROID STIM HORMONE: CPT | Performed by: FAMILY MEDICINE

## 2024-01-19 PROCEDURE — 36415 COLL VENOUS BLD VENIPUNCTURE: CPT | Performed by: FAMILY MEDICINE

## 2024-01-19 PROCEDURE — 99215 OFFICE O/P EST HI 40 MIN: CPT | Performed by: FAMILY MEDICINE

## 2024-01-19 PROCEDURE — 83001 ASSAY OF GONADOTROPIN (FSH): CPT | Performed by: FAMILY MEDICINE

## 2024-01-19 PROCEDURE — 85027 COMPLETE CBC AUTOMATED: CPT | Performed by: FAMILY MEDICINE

## 2024-01-19 RX ORDER — DEXAMETHASONE 4 MG/1
TABLET ORAL
Qty: 12 G | Refills: 0 | Status: SHIPPED | OUTPATIENT
Start: 2024-01-19 | End: 2024-02-01

## 2024-01-19 RX ORDER — HYDROCHLOROTHIAZIDE 12.5 MG/1
12.5 TABLET ORAL DAILY
Qty: 90 TABLET | Refills: 3 | Status: SHIPPED | OUTPATIENT
Start: 2024-01-19

## 2024-01-19 ASSESSMENT — PATIENT HEALTH QUESTIONNAIRE - PHQ9
SUM OF ALL RESPONSES TO PHQ QUESTIONS 1-9: 1
10. IF YOU CHECKED OFF ANY PROBLEMS, HOW DIFFICULT HAVE THESE PROBLEMS MADE IT FOR YOU TO DO YOUR WORK, TAKE CARE OF THINGS AT HOME, OR GET ALONG WITH OTHER PEOPLE: NOT DIFFICULT AT ALL
SUM OF ALL RESPONSES TO PHQ QUESTIONS 1-9: 1

## 2024-01-19 ASSESSMENT — ANXIETY QUESTIONNAIRES
7. FEELING AFRAID AS IF SOMETHING AWFUL MIGHT HAPPEN: NOT AT ALL
GAD7 TOTAL SCORE: 1
IF YOU CHECKED OFF ANY PROBLEMS ON THIS QUESTIONNAIRE, HOW DIFFICULT HAVE THESE PROBLEMS MADE IT FOR YOU TO DO YOUR WORK, TAKE CARE OF THINGS AT HOME, OR GET ALONG WITH OTHER PEOPLE: NOT DIFFICULT AT ALL
7. FEELING AFRAID AS IF SOMETHING AWFUL MIGHT HAPPEN: NOT AT ALL
1. FEELING NERVOUS, ANXIOUS, OR ON EDGE: NOT AT ALL
4. TROUBLE RELAXING: NOT AT ALL
2. NOT BEING ABLE TO STOP OR CONTROL WORRYING: NOT AT ALL
GAD7 TOTAL SCORE: 1
6. BECOMING EASILY ANNOYED OR IRRITABLE: SEVERAL DAYS
GAD7 TOTAL SCORE: 1
3. WORRYING TOO MUCH ABOUT DIFFERENT THINGS: NOT AT ALL
5. BEING SO RESTLESS THAT IT IS HARD TO SIT STILL: NOT AT ALL
8. IF YOU CHECKED OFF ANY PROBLEMS, HOW DIFFICULT HAVE THESE MADE IT FOR YOU TO DO YOUR WORK, TAKE CARE OF THINGS AT HOME, OR GET ALONG WITH OTHER PEOPLE?: NOT DIFFICULT AT ALL

## 2024-01-19 ASSESSMENT — ASTHMA QUESTIONNAIRES: ACT_TOTALSCORE: 21

## 2024-01-19 NOTE — PATIENT INSTRUCTIONS
Decrease atenolol to half tablets for 1 week and see how you feel. I've sent the hydrochlorothiazide 12.5mg that you can start after stopping the atenolol  Goal BP is less than 140/90.

## 2024-01-19 NOTE — PROGRESS NOTES
Assessment & Plan     Obesity due to excess calories with serious comorbidity, unspecified classification  Multifactorial causes reviewed today; starting with labs to assess baseline metabolic function since last year and we reviewed extensively her medical conditions and compounding factors including medications which can contribute to weight gain.  Some adjustments were made today and she is also interested in a referral to our pharmacy team to evaluate cost coverage for GLP-1 agonists if possible otherwise will be interested in potentially considering Wellbutrin/naltrexone along with metformin for prediabetes.   - Med Therapy Management Referral  - Hemoglobin A1c-did come back from 6.1 to 6.4% just barely still prediabetes  - Lipid panel reflex to direct LDL Non-fasting  - Basic metabolic panel  - TSH  - CBC with platelets    Essential hypertension  Historically has been a beta-blocker when she came to my practice and we had not changed however now she is noticing some exercise intolerance when she is trying to elevate her heart rate and so we will stop this and transition over to hydrochlorothiazide.  Especially given her history of asthma as well as the weight gain potential from the beta-blocker previously reviewed that this would be a reasonable transition she is ready now.    Decrease atenolol to half tablets for 1 week and see how you feel. I've sent the hydrochlorothiazide 12.5mg that you can start after stopping the atenolol  Goal BP is less than 140/90.    - hydrochlorothiazide (HYDRODIURIL) 12.5 MG tablet; Take 1 tablet (12.5 mg) by mouth daily  - Med Therapy Management Referral    Anxiety  Mild major depression (H24)  Primary insomnia  Currently on Lexapro and doxepin for sleep aid however we did review some weight gain potential with these medications.  Considering transition to Wellbutrin depending GLP-1 weight loss medication coverage.  - Med Therapy Management Referral    Subclinical  "hypothyroidism  Has been on levothyroxine for some time now and we will recheck TSH today    Menopausal syndrome (hot flashes)  Given that she is had a hysterectomy and experiencing some hot flashes that are becoming more problematic in her day-to-day life I recommended a FSH level and we briefly reviewed pros and cons for hormone replacement therapy in alignment with the NAMS guidelines.  She is going to consider estrogen replacement versus weight loss optimization first.  - Follicle stimulating hormone; Future  - Med Therapy Management Referral    Mild intermittent asthma without complication  Historically has been using Flovent just in the setting of asthma flares however I recommended that she resume this more on a daily basis for the next few weeks given her underlying cough.  We will also be stopping her beta-blocker.  ACT score today is 21.  Other causes for chronic cough could include reflux and this was not evaluated in detail today given her other concerns addressed  - FLOVENT  MCG/ACT inhaler; [FLOVENT  MCG/ACTUATION INHALER] TAKE 1 PUFF BY MOUTH TWICE A DAY  - Med Therapy Management Referral      FOLLOW UP: 3 MONTHS PHYSICAL    42 minutes spent on the date of the encounter doing chart review, patient visit and documentation.      BMI  Estimated body mass index is 33.59 kg/m  as calculated from the following:    Height as of this encounter: 1.753 m (5' 9\").    Weight as of this encounter: 103.2 kg (227 lb 7 oz).   Weight management plan: Discussed healthy diet and exercise guidelines        Sean Burciaga is a 58 year old, presenting for the following health issues:  Follow Up (Med check - also wants to discuss weight gain )        1/19/2024     1:51 PM   Additional Questions   Roomed by aa     History of Present Illness     Asthma:  She presents for follow up of asthma.  She has some cough, no wheezing, and no shortness of breath. She is not using a relief medication.  She does not " have a controller medication. Patient is aware of the following triggers: upper respiratory infections. The patient has not had a visit to the Emergency Room, Urgent Care or Hospital due to asthma since the last clinic visit.     Hypertension: She presents for follow up of hypertension.  She does not check blood pressure  regularly outside of the clinic. Outpatient blood pressures have not been over 140/90. She does not follow a low salt diet.     Hypothyroidism:     Since last visit, patient describes the following symptoms::  Dry skin, Fatigue, Hair loss and Weight gain    Weight gain::  16-20 lbs.    Reason for visit:  Follow up, hbp, low thyroid, weight gain    She eats 2-3 servings of fruits and vegetables daily.She consumes 0 sweetened beverage(s) daily.She exercises with enough effort to increase her heart rate 30 to 60 minutes per day.  She exercises with enough effort to increase her heart rate 3 or less days per week.   She is taking medications regularly.     Weight gain struggles: She has been walking 3x/week - on a bblocker and feels her heart hasn't been able to increase enough and feels really crummy with this on board  She just joined [employer] and [employer]- suggested naltrexone to her  Has cut out red meat, eating shrimp/crab    She gets post menopausal symptoms which are pretty miserable for her- waking up between 2-4am with hot flushes.   Hysterectomy age 50-   Has had genetic testing for breast cancer (dad's mom  in her 30s from breast cancer; 22yo daughter had a colonoscopy that found polyps and so she had genetic testing and advised Gualberto to do so; no findings there)  She did see ob/gyn for this but wasn't offered labs or any supplementation/hormones      She needed a prednisone burst in the winter for asthma flare; lingering cough.    Wt Readings from Last 3 Encounters:   24 103.2 kg (227 lb 7 oz)   22 98.9 kg (218 lb)   20 97.7 kg (215 lb 6.4 oz)            Objective    /80 (BP Location: Left arm, Patient Position: Sitting, Cuff Size: Adult Regular)   Resp 16   Wt 103.2 kg (227 lb 7 oz)   BMI 33.83 kg/m    Body mass index is 33.83 kg/m .  Physical Exam   GENERAL: alert and no distress  EYES: Eyes grossly normal to inspection, PERRL and conjunctivae and sclerae normal  HENT: ear canals and TM's normal, nose and mouth without ulcers or lesions  NECK: no adenopathy, no asymmetry, masses, or scars  RESP: lungs clear to auscultation - no rales, rhonchi or wheezes  CV: regular rate and rhythm, normal S1 S2, no S3 or S4, no murmur, click or rub, no peripheral edema  MS: no gross musculoskeletal defects noted, no edema  SKIN: no suspicious lesions or rashes  NEURO: Normal strength and tone, mentation intact and speech normal  PSYCH: mentation appears normal, affect normal/bright  LYMPH: no cervical, supraclavicular, axillary, or inguinal adenopathy          Signed Electronically by: Mary Winn MD

## 2024-01-20 LAB
ANION GAP SERPL CALCULATED.3IONS-SCNC: 12 MMOL/L (ref 7–15)
BUN SERPL-MCNC: 9.8 MG/DL (ref 6–20)
CALCIUM SERPL-MCNC: 9.6 MG/DL (ref 8.6–10)
CHLORIDE SERPL-SCNC: 102 MMOL/L (ref 98–107)
CHOLEST SERPL-MCNC: 209 MG/DL
CREAT SERPL-MCNC: 0.64 MG/DL (ref 0.51–0.95)
DEPRECATED HCO3 PLAS-SCNC: 27 MMOL/L (ref 22–29)
EGFRCR SERPLBLD CKD-EPI 2021: >90 ML/MIN/1.73M2
FASTING STATUS PATIENT QL REPORTED: NO
FSH SERPL IRP2-ACNC: 50.4 MIU/ML
GLUCOSE SERPL-MCNC: 128 MG/DL (ref 70–99)
HDLC SERPL-MCNC: 34 MG/DL
LDLC SERPL CALC-MCNC: 97 MG/DL
NONHDLC SERPL-MCNC: 175 MG/DL
POTASSIUM SERPL-SCNC: 4.3 MMOL/L (ref 3.4–5.3)
SODIUM SERPL-SCNC: 141 MMOL/L (ref 135–145)
TRIGL SERPL-MCNC: 389 MG/DL
TSH SERPL DL<=0.005 MIU/L-ACNC: 2.4 UIU/ML (ref 0.3–4.2)

## 2024-01-28 ENCOUNTER — MYC MEDICAL ADVICE (OUTPATIENT)
Dept: FAMILY MEDICINE | Facility: CLINIC | Age: 59
End: 2024-01-28
Payer: COMMERCIAL

## 2024-01-28 DIAGNOSIS — J45.20 MILD INTERMITTENT ASTHMA WITHOUT COMPLICATION: ICD-10-CM

## 2024-02-01 NOTE — TELEPHONE ENCOUNTER
See MyChart from patient needing PCP review.    Script pended. Writer removed GINA check box.    Carol Hills RN  [facility]

## 2024-02-02 ENCOUNTER — TRANSFERRED RECORDS (OUTPATIENT)
Dept: HEALTH INFORMATION MANAGEMENT | Facility: CLINIC | Age: 59
End: 2024-02-02

## 2024-02-02 ENCOUNTER — E-VISIT (OUTPATIENT)
Dept: URGENT CARE | Facility: CLINIC | Age: 59
End: 2024-02-02
Payer: COMMERCIAL

## 2024-02-02 DIAGNOSIS — J01.90 ACUTE SINUSITIS, RECURRENCE NOT SPECIFIED, UNSPECIFIED LOCATION: Primary | ICD-10-CM

## 2024-02-02 DIAGNOSIS — J45.20 MILD INTERMITTENT ASTHMA WITHOUT COMPLICATION: Primary | ICD-10-CM

## 2024-02-02 PROCEDURE — 99421 OL DIG E/M SVC 5-10 MIN: CPT | Performed by: PHYSICIAN ASSISTANT

## 2024-02-02 RX ORDER — FLUTICASONE PROPIONATE 110 UG/1
AEROSOL, METERED RESPIRATORY (INHALATION)
Qty: 12 G | Refills: 0 | Status: SHIPPED | OUTPATIENT
Start: 2024-02-02 | End: 2024-02-14

## 2024-02-02 NOTE — PATIENT INSTRUCTIONS
Acute Sinusitis: Care Instructions  Overview     Acute sinusitis is an inflammation of the mucous membranes inside the nose and sinuses. Sinuses are the hollow spaces in your skull around the eyes and nose. Acute sinusitis often follows a cold. Acute sinusitis causes thick, discolored mucus that drains from the nose or down the back of the throat. It also can cause pain and pressure in your head and face along with a stuffy or blocked nose.  In most cases, sinusitis gets better on its own in 1 to 2 weeks. But some mild symptoms may last for several weeks. Sometimes antibiotics are needed if there is a bacterial infection.  Follow-up care is a key part of your treatment and safety. Be sure to make and go to all appointments, and call your doctor if you are having problems. It's also a good idea to know your test results and keep a list of the medicines you take.  How can you care for yourself at home?  Use saline (saltwater) nasal washes. This can help keep your nasal passages open and wash out mucus and allergens.  You can buy saline nose washes at a grocery store or drugstore. Follow the instructions on the package.  You can make your own at home. Add 1 teaspoon of non-iodized salt and 1 teaspoon of baking soda to 2 cups of distilled or boiled and cooled water. Fill a squeeze bottle or a nasal cleansing pot (such as a neti pot) with the nasal wash. Then put the tip into your nostril, and lean over the sink. With your mouth open, gently squirt the liquid. Repeat on the other side.  Try a decongestant nasal spray like oxymetazoline (Afrin). Do not use it for more than 3 days in a row. Using it for more than 3 days can make your congestion worse.  If needed, take an over-the-counter pain medicine, such as acetaminophen (Tylenol), ibuprofen (Advil, Motrin), or naproxen (Aleve). Read and follow all instructions on the label.  If the doctor prescribed antibiotics, take them as directed. Do not stop taking them just  "because you feel better. You need to take the full course of antibiotics.  Be careful when taking over-the-counter cold or flu medicines and Tylenol at the same time. Many of these medicines have acetaminophen, which is Tylenol. Read the labels to make sure that you are not taking more than the recommended dose. Too much acetaminophen (Tylenol) can be harmful.  Try a steroid nasal spray. It may help with your symptoms.  Breathe warm, moist air. You can use a steamy shower, a hot bath, or a sink filled with hot water. Avoid cold, dry air. Using a humidifier in your home may help. Follow the directions for cleaning the machine.  When should you call for help?   Call your doctor now or seek immediate medical care if:    You have new or worse swelling, redness, or pain in your face or around one or both of your eyes.     You have double vision or a change in your vision.     You have a high fever.     You have a severe headache and a stiff neck.     You have mental changes, such as feeling confused or much less alert.   Watch closely for changes in your health, and be sure to contact your doctor if:    You are not getting better as expected.   Where can you learn more?  Go to https://www.[employer].net/patiented  Enter I933 in the search box to learn more about \"Acute Sinusitis: Care Instructions.\"  Current as of: February 28, 2023               Content Version: 13.8    8057-3062 [employer].   Care instructions adapted under license by your healthcare professional. If you have questions about a medical condition or this instruction, always ask your healthcare professional. [employer] disclaims any warranty or liability for your use of this information.      Dear Gualberto Saunders    After reviewing your responses, I've been able to diagnose you with Acute sinusitis, recurrence not specified, unspecified location.      Based on your responses and diagnosis, I have prescribed   Orders Placed " This Encounter   Medications     amoxicillin-clavulanate (AUGMENTIN) 875-125 MG tablet     Sig: Take 1 tablet by mouth 2 times daily for 7 days     Dispense:  14 tablet     Refill:  0      to treat your symptoms. I have sent this to your pharmacy.?     It is also important to stay well hydrated, get lots of rest and take over-the-counter decongestants,?tylenol?or ibuprofen if you?are able to?take those medications per your primary care provider to help relieve discomfort.?     It is important that you take?all of?your prescribed medication even if your symptoms are improving after a few doses.? Taking?all of?your medicine helps prevent the symptoms from returning.?     If your symptoms worsen, you develop severe headache, vomiting, high fever (>102), or are not improving in 7 days, please contact your primary care provider for an appointment or visit any of our convenient Walk-in Care or Urgent Care Centers to be seen which can be found on our website?here.?     Thanks again for choosing?us?as your health care partner,?   ?  Kaylyn Sandy PA-C?   Thank you for choosing us for your care. I have placed an order for a prescription so that you can start treatment. View your full visit summary for details by clicking on the link below. Your pharmacist will able to address any questions you may have about the medication.     If you're not feeling better within 5-7 days, please schedule an appointment.  You can schedule an appointment right here in [facility], or call [phone]  If the visit is for the same symptoms as your eVisit, we'll refund the cost of your eVisit if seen within seven days.

## 2024-02-08 ENCOUNTER — MYC MEDICAL ADVICE (OUTPATIENT)
Dept: FAMILY MEDICINE | Facility: CLINIC | Age: 59
End: 2024-02-08
Payer: COMMERCIAL

## 2024-02-08 DIAGNOSIS — T36.95XA ANTIBIOTIC-INDUCED YEAST INFECTION: Primary | ICD-10-CM

## 2024-02-08 DIAGNOSIS — B37.9 ANTIBIOTIC-INDUCED YEAST INFECTION: Primary | ICD-10-CM

## 2024-02-08 RX ORDER — FLUCONAZOLE 150 MG/1
150 TABLET ORAL ONCE
Qty: 1 TABLET | Refills: 0 | Status: SHIPPED | OUTPATIENT
Start: 2024-02-08 | End: 2024-02-08

## 2024-02-14 RX ORDER — SPIRONOLACTONE 50 MG/1
50 TABLET, FILM COATED ORAL DAILY
COMMUNITY
End: 2024-06-07

## 2024-02-14 RX ORDER — ALBUTEROL SULFATE 90 UG/1
2 AEROSOL, METERED RESPIRATORY (INHALATION) EVERY 6 HOURS PRN
COMMUNITY

## 2024-02-14 NOTE — PROGRESS NOTES
Medication Therapy Management (MTM) Encounter    ASSESSMENT:                            1. Class 1 obesity/Pre-diabetes  Discussed options for weight loss including GLP-1 agonists and naltrexone/bupropion. Would recommend GLP-1 agonist given patient's underlying pre-diabetes diagnosis. Wegovy and Zepbound both require prior authorization and would preferentially try for Zepbound given superior efficacy and generally better tolerated. Patient is hesitant given possible side effects and would prefer to start naltrexone monotherapy. Medication education and counseling was provided, including indication, expected effect, dosing instructions, and possible side effects. The patient's questions were answered. Discussed option to add bupropion in the future if needed, but prefers to wait given possible risk of worsening insomnia. If naltrexone ineffective, she may be interested in starting Zepbound.     2. Essential (primary) hypertension  Blood pressure is well controlled and meeting goal of <130/80 mm Hg per ACC/AHA hypertension guidelines. Recent change from atenolol to hydrochlorothiazide due to exercise intolerance, history of asthma and weight gain potential. Patient will start checking home blood pressure and can recheck in clinic at upcoming PCP visit.     3. Mild major depression/Insomnia   Adequately controlled on current medications.     4. Mild intermittent asthma   Well controlled with most recent ACT meeting goal >20. Uses ICS inhaler more regularly when has a viral infection.     5. Subclinical hypothyroidism  Stable on current dose of levothyroxine with TSH within normal range.     6. Gastroesophageal reflux disease   Improvement after change from lansoprazole to omeprazole.       PLAN:                            Start naltrexone to help with appetite suppression and weight loss. Take 1/4 tablet (12.5 mg) daily with food for 1 week, then increase to 1/4 tablet (12.5 mg) two times a day.      The most common  "side effects with low dose naltrexone are headache, sleep disturbance, anxiety, dizziness, nausea, joint aches, and dry mouth, although it is generally well tolerated.      In the future, if you are looking for additional effect, we could add bupropion (Wellbutrin) OR replace naltrexone with an injectable weight loss medication like Wegovy or Zebound - pending prior authorization approval. The benefit of these injectable medications is they can also help with blood sugar control given your pre-diabetes diagnosis.      Follow-up: Return in about 2 months (around 4/20/2024).    SUBJECTIVE/OBJECTIVE:                          Gualberto Saunders is a 58 year old female called for an initial visit. She was referred to me from Mary Navarrete.      Reason for visit: weight loss medication.    Allergies/ADRs: Reviewed in chart  Past Medical History: Reviewed in chart  Tobacco: She reports that she has never smoked. She has never used smokeless tobacco.  Alcohol: Reviewed in chart    Medication Adherence/Access: no issues reported. Works as an OR RN.     1. Class 1 obesity/Pre-diabetes  No current medications    Recent A1c pre-diabetes range 6.4%. Nervous about doing an injectable medication given their possible GI side effects. She wonders about starting a medication like naltrexone instead.   Nutrition/Exercise: Walks three times a week and going to start Pilates.  Just joined [employer].   Medication History:  None.     Wt Readings from Last 4 Encounters:   01/19/24 227 lb 7 oz (103.2 kg)   02/18/22 218 lb (98.9 kg)   07/14/20 215 lb 6.4 oz (97.7 kg)   02/17/20 215 lb (97.5 kg)     Estimated body mass index is 33.59 kg/m  as calculated from the following:    Height as of 1/19/24: 5' 9\" (1.753 m).    Weight as of 1/19/24: 227 lb 7 oz (103.2 kg).    2. Essential (primary) hypertension  Hydrochlorothiazide 12.5 mg daily  Spironolactone 50 mg daily    Patient reports no current medication side effects. Recent " change from atenolol to hydrochlorothiazide due to not being able increase heart rate while exercising. Hasn't checked blood pressure recently as just getting back from vacation. Takes spironolactone for skin.     BP Readings from Last 3 Encounters:   01/19/24 118/80   03/03/23 120/82   02/18/22 112/72     Pulse Readings from Last 3 Encounters:   03/03/23 65   02/18/22 64   07/14/20 60     3. Mild major depression/Insomnia   Doxepin 10 mg at bedtime  Escitalopram 10 mg once daily    Patient reports no current medication side effects. Has a daughter with OCD and her anxiety stemmed partly from that and caring for her. Patient reports symptoms are stable. Has chronic insomnia and takes doxepin every evening. Find this effective.         2/18/2022    11:44 AM 3/3/2023     1:51 PM 1/19/2024     1:42 PM   PHQ-9 SCORE   PHQ-9 Total Score MyChart  2 (Minimal depression) 1 (Minimal depression)   PHQ-9 Total Score 1 2 1         2/18/2022    11:44 AM 3/2/2023     3:42 PM 1/19/2024     1:42 PM   ELIJAH-7 SCORE   Total Score  0 (minimal anxiety) 1 (minimal anxiety)   Total Score 1 0 1     4. Mild intermittent asthma   ICS - Arnuity Ellipta 100 mcg - one puff once daily  Albuterol (ProAir/Ventolin/Proventil)    Patient rinses their mouth after using steroid inhaler. Had an ongoing cough after a recent viral infection so started Arnuity Ellipta. Now just taking PRN since cough has subsided. Says she has viral induced asthma since she was a child.   Triggers include: upper respiratory infections.  Patient reports the following symptoms: none.        2/18/2022    11:44 AM 3/2/2023     3:46 PM 1/19/2024     2:23 PM   ACT Total Scores   ACT TOTAL SCORE (Goal Greater than or Equal to 20) 23 24 21   In the past 12 months, how many times did you visit the emergency room for your asthma without being admitted to the hospital? 0 0 0   In the past 12 months, how many times were you hospitalized overnight because of your asthma? 0 0 0     5.  Subclinical hypothyroidism  Levothyroxine 75 mcg daily    Patient is having the following symptoms: none.     TSH   Date Value Ref Range Status   01/19/2024 2.40 0.30 - 4.20 uIU/mL Final   02/18/2022 1.85 0.30 - 5.00 uIU/mL Final     6. Gastroesophageal reflux disease   Omeprazole 20 mg once daily     Patient reports no current symptoms. Sees GI. Recently changed from lansoprazole to omeprazole due to decreased efficacy. Feels she needs to switch back and forth every so often as they start to not work.       Vitals:   BP Readings from Last 3 Encounters:   01/19/24 118/80   03/03/23 120/82   02/18/22 112/72      Pulse Readings from Last 3 Encounters:   03/03/23 65   02/18/22 64   07/14/20 60     Wt Readings from Last 3 Encounters:   01/19/24 227 lb 7 oz (103.2 kg)   02/18/22 218 lb (98.9 kg)   07/14/20 215 lb 6.4 oz (97.7 kg)     ----------------    I spent 30 minutes with this patient today. All changes were made via collaborative practice agreement with Mary Winn MD. A copy of the visit note was provided to the patient's provider(s).    A summary of these recommendations was sent via [employer].    Andreia De Guzman, Carlos EnriqueD, BCACP  Medication Management (MTM) Pharmacist  [facility]       Telemedicine Visit Details  Type of service:  Telephone visit  Start Time:  2:30 PM  End Time:  3:00 PM     Medication Therapy Recommendations  Class 1 obesity due to excess calories with serious comorbidity and body mass index (BMI) of 33.0 to 33.9 in adult    Current Medication: naltrexone (DEPADE/REVIA) 50 MG tablet   Rationale: Untreated condition - Needs additional medication therapy - Indication   Recommendation: Start Medication   Status: Accepted per CPA

## 2024-02-20 ENCOUNTER — VIRTUAL VISIT (OUTPATIENT)
Dept: PHARMACY | Facility: CLINIC | Age: 59
End: 2024-02-20
Attending: FAMILY MEDICINE
Payer: COMMERCIAL

## 2024-02-20 DIAGNOSIS — E66.09 CLASS 1 OBESITY DUE TO EXCESS CALORIES WITH SERIOUS COMORBIDITY AND BODY MASS INDEX (BMI) OF 33.0 TO 33.9 IN ADULT: Primary | ICD-10-CM

## 2024-02-20 DIAGNOSIS — E66.811 CLASS 1 OBESITY DUE TO EXCESS CALORIES WITH SERIOUS COMORBIDITY AND BODY MASS INDEX (BMI) OF 33.0 TO 33.9 IN ADULT: Primary | ICD-10-CM

## 2024-02-20 DIAGNOSIS — J45.20 MILD INTERMITTENT ASTHMA WITHOUT COMPLICATION: ICD-10-CM

## 2024-02-20 DIAGNOSIS — E03.8 SUBCLINICAL HYPOTHYROIDISM: Chronic | ICD-10-CM

## 2024-02-20 DIAGNOSIS — F32.0 MILD MAJOR DEPRESSION (H): ICD-10-CM

## 2024-02-20 DIAGNOSIS — K21.9 GASTROESOPHAGEAL REFLUX DISEASE WITHOUT ESOPHAGITIS: ICD-10-CM

## 2024-02-20 DIAGNOSIS — I10 ESSENTIAL (PRIMARY) HYPERTENSION: ICD-10-CM

## 2024-02-20 PROCEDURE — 99605 MTMS BY PHARM NP 15 MIN: CPT | Mod: 93 | Performed by: PHARMACIST

## 2024-02-20 PROCEDURE — 99607 MTMS BY PHARM ADDL 15 MIN: CPT | Mod: 93 | Performed by: PHARMACIST

## 2024-02-20 RX ORDER — NALTREXONE HYDROCHLORIDE 50 MG/1
TABLET, FILM COATED ORAL
Qty: 30 TABLET | Refills: 3 | Status: SHIPPED | OUTPATIENT
Start: 2024-02-20 | End: 2024-03-18

## 2024-02-20 NOTE — PATIENT INSTRUCTIONS
"Recommendations from today's Medication Management (MTM) visit:                                                      Start naltrexone to help with appetite suppression and weight loss. Take 1/4 tablet (12.5 mg) daily with food for 1 week, then increase to 1/4 tablet (12.5 mg) two times a day.      The most common side effects with low dose naltrexone are headache, sleep disturbance, anxiety, dizziness, nausea, joint aches, and dry mouth, although it is generally well tolerated.      In the future, if you are looking for additional effect, we could add bupropion (Wellbutrin) OR replace naltrexone with an injectable weight loss medication like Wegovy or Zebound - pending prior authorization approval. The benefit of these injectable medications is they can also help with blood sugar control given your pre-diabetes diagnosis.      To schedule another MTM appointment, please call the MTM scheduling line at [phone] or toll-free at [phone].     My MTM pharmacist's contact information:      Please feel free to contact me with any questions or concerns you have via [employer] or calling the clinic.       Andreia De Guzman, PharmD, [facility] Center[facility]  Medication Management (MTM) Pharmacist  [facility]     It was great speaking with you today.  I value your experience and would be very thankful for your time in providing feedback in our clinic survey. In the next few days, you may receive an email or text message from [employer] with a link to a survey related to your  clinical pharmacist.\"     "

## 2024-03-01 ENCOUNTER — MYC REFILL (OUTPATIENT)
Dept: FAMILY MEDICINE | Facility: CLINIC | Age: 59
End: 2024-03-01
Payer: COMMERCIAL

## 2024-03-01 DIAGNOSIS — F51.01 PRIMARY INSOMNIA: ICD-10-CM

## 2024-03-01 RX ORDER — DOXEPIN HYDROCHLORIDE 10 MG/1
10 CAPSULE ORAL AT BEDTIME
Qty: 90 CAPSULE | Refills: 2 | Status: SHIPPED | OUTPATIENT
Start: 2024-03-01

## 2024-03-18 ENCOUNTER — MYC REFILL (OUTPATIENT)
Dept: FAMILY MEDICINE | Facility: CLINIC | Age: 59
End: 2024-03-18
Payer: COMMERCIAL

## 2024-03-18 DIAGNOSIS — E03.8 SUBCLINICAL HYPOTHYROIDISM: ICD-10-CM

## 2024-03-18 RX ORDER — LEVOTHYROXINE SODIUM 75 UG/1
75 TABLET ORAL DAILY
Qty: 90 TABLET | Refills: 1 | Status: SHIPPED | OUTPATIENT
Start: 2024-03-18 | End: 2024-09-16

## 2024-03-22 DIAGNOSIS — F32.0 MILD MAJOR DEPRESSION (H): ICD-10-CM

## 2024-03-22 DIAGNOSIS — F41.9 ANXIETY: ICD-10-CM

## 2024-03-22 RX ORDER — ESCITALOPRAM OXALATE 10 MG/1
TABLET ORAL
Qty: 90 TABLET | Refills: 0 | Status: SHIPPED | OUTPATIENT
Start: 2024-03-22 | End: 2024-05-02

## 2024-03-22 NOTE — TELEPHONE ENCOUNTER
Pending Prescriptions:                       Disp   Refills    escitalopram (LEXAPRO) 10 MG tablet       90 tab*0          Evelyn M. JUAN JOSE ([facility])

## 2024-04-29 SDOH — HEALTH STABILITY: PHYSICAL HEALTH: ON AVERAGE, HOW MANY MINUTES DO YOU ENGAGE IN EXERCISE AT THIS LEVEL?: 40 MIN

## 2024-04-29 SDOH — HEALTH STABILITY: PHYSICAL HEALTH: ON AVERAGE, HOW MANY DAYS PER WEEK DO YOU ENGAGE IN MODERATE TO STRENUOUS EXERCISE (LIKE A BRISK WALK)?: 2 DAYS

## 2024-04-29 ASSESSMENT — SOCIAL DETERMINANTS OF HEALTH (SDOH): HOW OFTEN DO YOU GET TOGETHER WITH FRIENDS OR RELATIVES?: TWICE A WEEK

## 2024-04-29 NOTE — COMMUNITY RESOURCES LIST (ENGLISH)
April 29, 2024           YOUR PERSONALIZED LIST OF SERVICES & PROGRAMS           & SHELTER    Housing      [facility] - Emergency Family [facility]ter  [address] [place] [address] (Distance: 11.6 miles)  Phone: [phone]) [phone]  Website: [email or web address]-resource-center/  Language: English  Fee: Free      [facility] Resources and Housing Information - Emergency housing  [address] [place] [address] (Distance: 5.1 miles)  Language: English  Fee: Free  Accessibility: Translation services      [facility] - Housing Stabilization Services  Phone: (839) 632-3887  Website: https://[employer]/Housing-Stabilization.html  Language: English  Hours: Mon 9:00 AM - 5:00 PM Tue 9:00 AM - 5:00 PM Wed 9:00 AM - 5:00 PM Thu 9:00 AM - 5:00 PM Fri 9:00 AM - 5:00 PM  Fee: Insurance  Accessibility: Deaf or hard of hearing, Translation services    Case Management      [facility] Resources and Housing Information - Housing search assistance  [address] [place] [address] (Distance: 5.1 miles)  Language: English  Fee: Free  Accessibility: Translation services      [facility]  [address] [place] [address] (Distance: 2.5 miles)  Phone: [phone]) [phone]  Language: English  Fee: Free  Accessibility: Translation services      [employer] - Housing Stabilization Services  Phone: [phone]) 348-6491  Website: https://homebasemn.com/  Language: English  Hours: Mon 8:00 AM - 4:00 PM Tue 8:00 AM - 4:00 PM Wed 8:00 AM - 4:00 PM Thu 8:00 AM - 4:00 PM Fri 8:00 AM - 4:00 PM  Fee: Free  Accessibility: Blind accommodation, Deaf or hard of hearing  Transportation Options: Free transportation    Drop-In Services      [facility] - Warming or cooling center [facility]  [address] [place] [address] (Distance: 7.5 miles)  Language: English,  Mexican, Uruguayan  Fee: Free      [facility] - Warming or cooling center  [address] [place] [address] (Distance: 6.1 miles)  Phone: [phone]) [phone]  Language: English, Mexican, Russian, Hmong  Fee: Free  Accessibility: Translation services      [employer] - MAIL SERVICE FOR THE HOMELESS  Phone: (197) 138-6869  Website: http://www.iCabbi               IMPORTANT NUMBERS & WEBSITES        Emergency Services  911  .   [employer] Way  211 http://211unitedway.org  .   Poison Control  (990) 577-1428 http://[place]poison.org http://w[place]poison.org  .     Suicide and Crisis Lifeline  988 http://988InCrowdline.org  .   Childhelp [place] Child Abuse Hotline  770.795.3762 http://Childhelphotline.org   .   [place] Sexual Assault Hotline  (948) 389-5169 (HOPE) http://[place].Zetta.net   .     [place] Runaway Safeline  (860) 221-4415 (RUNAWAY) http://nCrypted Cloud.Zetta.net  .   Pregnancy & Postpartum Support  Call/text 003-357-3835  [place]: http://ppsupport[place].org  [place]: http://Biocycle.com/wi  .   Substance Abuse National Helpline ([facility])  351-773-HELP (1282) http://Findtreatment.gov   .                DISCLAIMER: These resources have been generated via the [employer] Platform. [employer] does not endorse any service providers mentioned in this resource list. [employer] does not guarantee that the services mentioned in this resource list will be available to you or will improve your health or wellness.    [facility]

## 2024-05-02 ENCOUNTER — OFFICE VISIT (OUTPATIENT)
Dept: FAMILY MEDICINE | Facility: CLINIC | Age: 59
End: 2024-05-02
Attending: FAMILY MEDICINE
Payer: COMMERCIAL

## 2024-05-02 VITALS
WEIGHT: 216 LBS | BODY MASS INDEX: 32.74 KG/M2 | HEART RATE: 82 BPM | TEMPERATURE: 98 F | OXYGEN SATURATION: 97 % | SYSTOLIC BLOOD PRESSURE: 129 MMHG | HEIGHT: 68 IN | DIASTOLIC BLOOD PRESSURE: 80 MMHG

## 2024-05-02 DIAGNOSIS — E66.09 CLASS 1 OBESITY DUE TO EXCESS CALORIES WITH SERIOUS COMORBIDITY AND BODY MASS INDEX (BMI) OF 33.0 TO 33.9 IN ADULT: ICD-10-CM

## 2024-05-02 DIAGNOSIS — N95.1 MENOPAUSAL SYNDROME (HOT FLASHES): ICD-10-CM

## 2024-05-02 DIAGNOSIS — J45.20 MILD INTERMITTENT ASTHMA WITHOUT COMPLICATION: ICD-10-CM

## 2024-05-02 DIAGNOSIS — E03.8 SUBCLINICAL HYPOTHYROIDISM: ICD-10-CM

## 2024-05-02 DIAGNOSIS — R73.03 PREDIABETES: ICD-10-CM

## 2024-05-02 DIAGNOSIS — Z00.00 ROUTINE GENERAL MEDICAL EXAMINATION AT A HEALTH CARE FACILITY: Primary | ICD-10-CM

## 2024-05-02 DIAGNOSIS — F41.9 ANXIETY: ICD-10-CM

## 2024-05-02 DIAGNOSIS — F32.0 MILD MAJOR DEPRESSION (H): ICD-10-CM

## 2024-05-02 DIAGNOSIS — E66.811 CLASS 1 OBESITY DUE TO EXCESS CALORIES WITH SERIOUS COMORBIDITY AND BODY MASS INDEX (BMI) OF 33.0 TO 33.9 IN ADULT: ICD-10-CM

## 2024-05-02 DIAGNOSIS — E66.09 OBESITY DUE TO EXCESS CALORIES WITH SERIOUS COMORBIDITY, UNSPECIFIED CLASSIFICATION: ICD-10-CM

## 2024-05-02 DIAGNOSIS — I10 ESSENTIAL HYPERTENSION: ICD-10-CM

## 2024-05-02 PROBLEM — Z83.719 FAMILY HISTORY OF POLYPS IN THE COLON: Status: RESOLVED | Noted: 2020-07-14 | Resolved: 2024-05-02

## 2024-05-02 PROBLEM — Z91.09 ENVIRONMENTAL ALLERGIES: Status: ACTIVE | Noted: 2021-04-01

## 2024-05-02 PROBLEM — N73.9 FEMALE PELVIC INFLAMMATORY DISEASE: Status: ACTIVE | Noted: 2021-04-01

## 2024-05-02 LAB — HBA1C MFR BLD: 5.9 % (ref 0–5.6)

## 2024-05-02 PROCEDURE — 83036 HEMOGLOBIN GLYCOSYLATED A1C: CPT | Performed by: FAMILY MEDICINE

## 2024-05-02 PROCEDURE — 99396 PREV VISIT EST AGE 40-64: CPT | Performed by: FAMILY MEDICINE

## 2024-05-02 PROCEDURE — 99215 OFFICE O/P EST HI 40 MIN: CPT | Mod: 25 | Performed by: FAMILY MEDICINE

## 2024-05-02 PROCEDURE — 86706 HEP B SURFACE ANTIBODY: CPT | Performed by: FAMILY MEDICINE

## 2024-05-02 PROCEDURE — 36415 COLL VENOUS BLD VENIPUNCTURE: CPT | Performed by: FAMILY MEDICINE

## 2024-05-02 RX ORDER — METHYLPREDNISOLONE 4 MG
TABLET, DOSE PACK ORAL
COMMUNITY
End: 2024-05-02

## 2024-05-02 RX ORDER — DOXYCYCLINE HYCLATE 100 MG
TABLET ORAL
COMMUNITY
Start: 2023-11-03 | End: 2024-06-07

## 2024-05-02 RX ORDER — SPIRONOLACTONE 100 MG/1
100 TABLET, FILM COATED ORAL EVERY MORNING
COMMUNITY
Start: 2024-04-18

## 2024-05-02 RX ORDER — ESCITALOPRAM OXALATE 10 MG/1
TABLET ORAL
Qty: 90 TABLET | Refills: 3 | Status: SHIPPED | OUTPATIENT
Start: 2024-05-02

## 2024-05-02 RX ORDER — DOXYCYCLINE 100 MG/1
100 CAPSULE ORAL 2 TIMES DAILY
COMMUNITY
Start: 2023-11-22 | End: 2024-06-07

## 2024-05-02 RX ORDER — NALTREXONE HYDROCHLORIDE 50 MG/1
25 TABLET, FILM COATED ORAL 2 TIMES DAILY
Qty: 90 TABLET | Refills: 3 | Status: SHIPPED | OUTPATIENT
Start: 2024-05-30 | End: 2024-05-30

## 2024-05-02 RX ORDER — ESTRADIOL 0.03 MG/D
1 FILM, EXTENDED RELEASE TRANSDERMAL
Qty: 8 PATCH | Refills: 11 | Status: SHIPPED | OUTPATIENT
Start: 2024-05-02 | End: 2024-09-30 | Stop reason: DRUGHIGH

## 2024-05-02 ASSESSMENT — ANXIETY QUESTIONNAIRES
7. FEELING AFRAID AS IF SOMETHING AWFUL MIGHT HAPPEN: NOT AT ALL
8. IF YOU CHECKED OFF ANY PROBLEMS, HOW DIFFICULT HAVE THESE MADE IT FOR YOU TO DO YOUR WORK, TAKE CARE OF THINGS AT HOME, OR GET ALONG WITH OTHER PEOPLE?: NOT DIFFICULT AT ALL
GAD7 TOTAL SCORE: 0
6. BECOMING EASILY ANNOYED OR IRRITABLE: NOT AT ALL
2. NOT BEING ABLE TO STOP OR CONTROL WORRYING: NOT AT ALL
7. FEELING AFRAID AS IF SOMETHING AWFUL MIGHT HAPPEN: NOT AT ALL
IF YOU CHECKED OFF ANY PROBLEMS ON THIS QUESTIONNAIRE, HOW DIFFICULT HAVE THESE PROBLEMS MADE IT FOR YOU TO DO YOUR WORK, TAKE CARE OF THINGS AT HOME, OR GET ALONG WITH OTHER PEOPLE: NOT DIFFICULT AT ALL
4. TROUBLE RELAXING: NOT AT ALL
GAD7 TOTAL SCORE: 0
GAD7 TOTAL SCORE: 0
5. BEING SO RESTLESS THAT IT IS HARD TO SIT STILL: NOT AT ALL
3. WORRYING TOO MUCH ABOUT DIFFERENT THINGS: NOT AT ALL
1. FEELING NERVOUS, ANXIOUS, OR ON EDGE: NOT AT ALL

## 2024-05-02 ASSESSMENT — PATIENT HEALTH QUESTIONNAIRE - PHQ9
10. IF YOU CHECKED OFF ANY PROBLEMS, HOW DIFFICULT HAVE THESE PROBLEMS MADE IT FOR YOU TO DO YOUR WORK, TAKE CARE OF THINGS AT HOME, OR GET ALONG WITH OTHER PEOPLE: NOT DIFFICULT AT ALL
SUM OF ALL RESPONSES TO PHQ QUESTIONS 1-9: 1
SUM OF ALL RESPONSES TO PHQ QUESTIONS 1-9: 1

## 2024-05-02 ASSESSMENT — ASTHMA QUESTIONNAIRES
QUESTION_1 LAST FOUR WEEKS HOW MUCH OF THE TIME DID YOUR ASTHMA KEEP YOU FROM GETTING AS MUCH DONE AT WORK, SCHOOL OR AT HOME: NONE OF THE TIME
QUESTION_2 LAST FOUR WEEKS HOW OFTEN HAVE YOU HAD SHORTNESS OF BREATH: NOT AT ALL
QUESTION_5 LAST FOUR WEEKS HOW WOULD YOU RATE YOUR ASTHMA CONTROL: COMPLETELY CONTROLLED
QUESTION_4 LAST FOUR WEEKS HOW OFTEN HAVE YOU USED YOUR RESCUE INHALER OR NEBULIZER MEDICATION (SUCH AS ALBUTEROL): NOT AT ALL
ACT_TOTALSCORE: 25
ACT_TOTALSCORE: 25
QUESTION_3 LAST FOUR WEEKS HOW OFTEN DID YOUR ASTHMA SYMPTOMS (WHEEZING, COUGHING, SHORTNESS OF BREATH, CHEST TIGHTNESS OR PAIN) WAKE YOU UP AT NIGHT OR EARLIER THAN USUAL IN THE MORNING: NOT AT ALL

## 2024-05-02 ASSESSMENT — PAIN SCALES - GENERAL: PAINLEVEL: NO PAIN (0)

## 2024-05-02 NOTE — PROGRESS NOTES
Preventive Care Visit  [facility]  Mary Winn MD, Family Medicine  May 2, 2024      Assessment & Plan     Routine general medical examination at a health care facility  Doing well- full labs were done 3 months ago  - Hemoglobin A1c; Future  - Hepatitis B Surface Antibody; Future    Menopausal syndrome (hot flashes)  Reviewed pt is likely in late perimenopause transition; menstrual irregularity, mood swings, and most importantly severe hot flashes affecting quality of life. She is interested in hormone replacement therapy and she is a low risk candidate and this would be reasonable given no hx of CAD, HTN, stroke, smoking or other significant medical hx.   - Discussed testing with FSH and LH at this point would not necessarily .     - Environmental and behavioral adaptations discussed today to better manage her symptoms   - Long risk/benefit conversation had today regarding HRT.  After discussion of benefits to reduce symptoms/improve QOL, and side effect profile, including possibility for heart disease, breast cancer, stroke, PE, DVT and other concerns she agrees to start estrogen replacement therapy in the form of  transdermal estrogen.   - Uterus absent, will not need progesterone.   - estradiol (VIVELLE-DOT) 0.025 MG/24HR bi-weekly patch; Place 1 patch onto the skin twice a week    Obesity due to excess calories with serious comorbidity, unspecified classification  Prediabetes  A1c in Jan 6.4%; lost about 10lbs since that time with naltrexone  - Hemoglobin A1c; Future--> improved to 5.9% with her weight loss journey    Class 1 obesity due to excess calories with serious comorbidity and body mass index (BMI) of 33.0 to 33.9 in adult  Desires naltrexone over GLP1 at this time. Continue/refill Rx.   - naltrexone (DEPADE/REVIA) 50 MG tablet; Take 0.5 tablets (25 mg) by mouth 2 times daily    Essential hypertension  Stable now on hydrochlorothiazide instead of  bblocker and feeling much better (removed due to side effects/low HR, weight gain and asthma hx)    Subclinical hypothyroidism  Stable, last TSH normal. Continues synthroid    Mild major depression (H24)  Anxiety  Stable, med refilled  - escitalopram (LEXAPRO) 10 MG tablet; Take 1 tablet (10 mg) by mouth daily    Mild intermittent asthma without complication  Hasn't needed the fluticasone yet but encouraged to do so over the winter              Counseling  Appropriate preventive services were discussed with this patient, including applicable screening as appropriate for fall prevention, nutrition, physical activity, Tobacco-use cessation, weight loss and cognition.  Checklist reviewing preventive services available has been given to the patient.  Reviewed patient's diet, addressing concerns and/or questions.   She is at risk for lack of exercise and has been provided with information to increase physical activity for the benefit of her well-being.           Sean Burciaga is a 58 year old, presenting for the following:  Physical and Recheck Medication (Naltrexone for weightloss, discuss blood pressure medication updates - hydrochlorothiazide)        5/2/2024    12:39 PM   Additional Questions   Roomed by [facility], Visit Facilitator        Via the Health Maintenance questionnaire, the patient has reported the following services have been completed -Mammogram, this information has been sent to the abstraction team.  Health Care Directive  Patient does not have a Health Care Directive or Living Will: Discussed advance care planning with patient; however, patient declined at this time.    HPI  Chief Complaint   Patient presents with    Physical    Recheck Medication     Naltrexone for weightloss, discuss blood pressure medication updates - hydrochlorothiazide     HTN: feeling really good since stopping the atenolol; hydrochlorothiazide low dose keeping BP at 110-130/80s     Asthma: it took about 3 weeks to get  the flovent Rx due to insurance switching. Hasn't needed to use it.     Obesity: doing 1/2 tab naltrexone twice daily (1/4 tabs crumble). Due to side effects (food in stomach for so long) she wants to hold off on Zepbound for right now.   She has lost some weight and has noticed feeling more full.  More busy with work/outside time.     Due for mammogram- last in 2021  Had her DEXA scan; ordered by Dr Roach    GERD: hiatal hernia: Takes PPI daily    S/p hysterectomy  Would like to talk about HRT for hot flashes and hot flushes that are bad at night. Always hot all day      Social History     Social History Narrative    She is an OR nurse at [employer].    , they have 3 daughters.  1 daughter, the fiancé and grandchild (1-year-old) is currently living with Gualberto.    She enjoys walking 3 times per week, gardening, mowing the lawn for physical [place] in the summer.  Occasional alcohol twice weekly, no smoking.  Mary Winn MD         Wt Readings from Last 3 Encounters:   05/02/24 98 kg (216 lb)   01/19/24 103.2 kg (227 lb 7 oz)   02/18/22 98.9 kg (218 lb)                 4/29/2024   General Health   How would you rate your overall physical health? Good   Feel stress (tense, anxious, or unable to sleep) Not at all         4/29/2024   Nutrition   Three or more servings of calcium each day? Yes   Diet: Regular (no restrictions)   How many servings of fruit and vegetables per day? (!) 2-3   How many sweetened beverages each day? 0-1         4/29/2024   Exercise   Days per week of moderate/strenous exercise 2 days   Average minutes spent exercising at this level 40 min   (!) EXERCISE CONCERN      4/29/2024   Social Factors   Frequency of gathering with friends or relatives Twice a week   Worry food won't last until get money to buy more No   Food not last or not have enough money for food? No   Do you have housing?  No   Are you worried about losing your housing? No   Lack of transportation? No    Unable to get utilities (heat,electricity)? No   Want help with housing or utility concern? No   (!) HOUSING CONCERN PRESENT      4/29/2024   Fall Risk   Fallen 2 or more times in the past year? No   Trouble with walking or balance? No          4/29/2024   Dental   Dentist two times every year? Yes         4/29/2024   TB Screening   Were you born outside of the [place]? No       Today's PHQ-9 Score:       5/2/2024    12:39 PM   PHQ-9 SCORE   PHQ-9 Total Score MyChart 1 (Minimal depression)   PHQ-9 Total Score 1         4/29/2024   Substance Use   Alcohol more than 3/day or more than 7/wk No   Do you use any other substances recreationally? No     Social History     Tobacco Use    Smoking status: Never     Passive exposure: Never    Smokeless tobacco: Never   Vaping Use    Vaping status: Never Used   Substance Use Topics    Alcohol use: Yes     Alcohol/week: 0.8 standard drinks of alcohol     Comment: Alcoholic Drinks/day: 2x/week             4/29/2024   Breast Cancer Screening   Family history of breast, colon, or ovarian cancer? Yes         4/29/2024   LAST FHS-7 RESULTS   1st degree relative breast or ovarian cancer Yes   Any relative bilateral breast cancer Unknown   Any male have breast cancer No   Any ONE woman have BOTH breast AND ovarian cancer No   Any woman with breast cancer before 50yrs Yes   2 or more relatives with breast AND/OR ovarian cancer No   2 or more relatives with breast AND/OR bowel cancer No                4/29/2024   STI Screening   New sexual partner(s) since last STI/HIV test? No     History of abnormal Pap smear: Status post benign hysterectomy. Health Maintenance and Surgical History updated.       ASCVD Risk   The 10-year ASCVD risk score (Shruti HERNANDEZ, et al., 2019) is: 5.1%    Values used to calculate the score:      Age: 58 years      Sex: Female      Is Non- : No      Diabetic: No      Tobacco smoker: No      Systolic Blood Pressure: 129 mmHg      Is BP  "treated: Yes      HDL Cholesterol: 34 mg/dL      Total Cholesterol: 209 mg/dL           Reviewed and updated as needed this visit by Provider   Tobacco  Allergies  Meds  Problems  Med Hx  Surg Hx  Fam Hx                     Objective    Exam  /80 (BP Location: Left arm, Patient Position: Sitting, Cuff Size: Adult Regular)   Pulse 82   Temp 98  F (36.7  C) (Oral)   Ht 1.727 m (5' 8\")   Wt 98 kg (216 lb)   SpO2 97%   BMI 32.84 kg/m     Estimated body mass index is 32.84 kg/m  as calculated from the following:    Height as of this encounter: 1.727 m (5' 8\").    Weight as of this encounter: 98 kg (216 lb).    Physical Exam  GENERAL: alert and no distress  EYES: Eyes grossly normal to inspection, PERRL and conjunctivae and sclerae normal  HENT: ear canals and TM's normal, nose and mouth without ulcers or lesions  NECK: no adenopathy, no asymmetry, masses, or scars  RESP: lungs clear to auscultation - no rales, rhonchi or wheezes  CV: regular rate and rhythm, normal S1 S2, no S3 or S4, no murmur, click or rub, no peripheral edema  ABDOMEN: soft, nontender, no hepatosplenomegaly, no masses and bowel sounds normal  MS: no gross musculoskeletal defects noted, no edema  SKIN: no suspicious lesions or rashes  NEURO: Normal strength and tone, mentation intact and speech normal  PSYCH: mentation appears normal, affect normal/bright        Signed Electronically by: Mary Winn MD    Answers submitted by the patient for this visit:  Patient Health Questionnaire (Submitted on 5/2/2024)  If you checked off any problems, how difficult have these problems made it for you to do your work, take care of things at home, or get along with other people?: Not difficult at all  PHQ9 TOTAL SCORE: 1  ELIJAH-7 (Submitted on 5/2/2024)  ELIJAH 7 TOTAL SCORE: 0    "

## 2024-05-02 NOTE — PATIENT INSTRUCTIONS
Hormone Replacement Therapy (HRT):  In the past, MHT was also often prescribed for prevention of coronary heart disease and osteoporosis, however in analysis of 18 trials HRT was associated with a number of adverse outcomes, including an excess risk of coronary heart disease, stroke, venous thromboembolism (DVT and pulmonary embolism), and breast cancer.  Many expert groups do suggest HRT for younger postmenopausal women (age 50-59) with moderate to severe symptoms and no contraindications to estrogen use. Women should be reassured that the absolute risk of complications for healthy, young postmenopausal women taking HRT for five years is very low.    For young post-menopausal/perimenopausal women:  Combined estrogen-progestin therapy - Number of cases (additional or fewer) per 1000 women per five years of hormone use when compared with placebo:   - Coronary heart disease (CHD) - 2.5 additional cases   - Invasive breast cancer - 3 additional cases   - Stroke - 2.5 additional cases   - Pulmonary embolism - 3 additional cases   - Colorectal cancer - 0.5 fewer cases   - Endometrial cancer - no difference   - Hip fracture - 1.5 fewer cases   - All-cause mortality - 5 fewer events  Estrogen-alone therapy - Number of cases (additional or fewer) per 1000 women per five years of hormone use when compared with placebo:   - CHD - 5.5 fewer cases   - Invasive breast cancer - 2.5 fewer cases   - Stroke - 0.5 fewer cases   - Pulmonary embolism - 1.5 additional cases   - Colorectal cancer - 0.5 fewer cases   - Hip fracture - 1.5 additional cases   - All-cause mortality - 5.5 fewer events    COGNITIVE FUNCTION AND DEMENTIA:  - Although some epidemiologic studies suggested that estrogen may preserve cognitive function and prevent dementia, data from the Women's Health Initiative did not support these observations.   GALLBLADDER DISEASE:  - A secondary analysis of data from the Women's Health Initiative found a significantly  increased risk of biliary tract disease among women using oral estrogen therapy  OSTEOPOROTIC FRACTURE   - The risk of osteoporotic fracture with combined hormone therapy versus placebo was reduced   - For women ages 50 to 59 years, the group most likely to be taking HT, the estimates of benefit in one analysis were 4.9 and 5.9 fewer fractures per 1000 women per five years of combined estrogen-progestin or unopposed estrogen use, respectively  TYPE 2 DIABETES MELLITUS   - Combined HRT appears to reduce the risk of type 2 diabetes mellitus, possibly mediated by a decrease in insulin resistance unrelated to body size. However, this effect is insufficient to recommend HT as a diabetes prevention strategy in women with CHD.  SKIN  - Some clinicians believe that estrogen helps to preserve the thickness and the collagen content of skin in postmenopausal women, but two four-year, randomized trials have reported no differences between MHT and placebo on age-related skin changes (wrinkling and skin rigidity)   WEIGHT  - There is no evidence of an effect of unopposed estrogen or combined estrogen-progestin on body weight or body mass index

## 2024-05-03 LAB
HBV SURFACE AB SERPL IA-ACNC: 981 M[IU]/ML
HBV SURFACE AB SERPL IA-ACNC: REACTIVE M[IU]/ML

## 2024-05-28 ENCOUNTER — MYC MEDICAL ADVICE (OUTPATIENT)
Dept: FAMILY MEDICINE | Facility: CLINIC | Age: 59
End: 2024-05-28
Payer: COMMERCIAL

## 2024-05-28 DIAGNOSIS — E66.811 CLASS 1 OBESITY DUE TO EXCESS CALORIES WITH SERIOUS COMORBIDITY AND BODY MASS INDEX (BMI) OF 33.0 TO 33.9 IN ADULT: ICD-10-CM

## 2024-05-28 DIAGNOSIS — E66.09 CLASS 1 OBESITY DUE TO EXCESS CALORIES WITH SERIOUS COMORBIDITY AND BODY MASS INDEX (BMI) OF 33.0 TO 33.9 IN ADULT: ICD-10-CM

## 2024-05-30 RX ORDER — NALTREXONE HYDROCHLORIDE 50 MG/1
25 TABLET, FILM COATED ORAL 2 TIMES DAILY
Qty: 90 TABLET | Refills: 3 | Status: SHIPPED | OUTPATIENT
Start: 2024-05-30

## 2024-06-06 NOTE — PROGRESS NOTES
Medication Therapy Management (MTM) Encounter    ASSESSMENT:                            Medication Adherence/Access: No issues identified    Hypertension   At goal <130/80 mmHg. The current medical regimen is effective;  continue present plan and medications.     Weight Management   Patient has lost about 14% of body weight since starting naltrexone and continues to have consistent weight loss so can continue on current regimen.     Mild intermittent asthma without complication:   Stable.     PLAN:                            No medication changes today.    Follow-up:   Appointments in Next Year      Aug 08, 2024 11:30 AM  (Arrive by 11:25 AM)  Provider Visit with Mary Winn MD  [facility] ([facility] ) [phone]          SUBJECTIVE/OBJECTIVE:                          Gualberto Saunders is a 58 year old female called for a follow-up visit from 2/20/24.     Reason for visit: weight management     Allergies/ADRs: Reviewed in chart  Past Medical History: Reviewed in chart  Tobacco: She reports that she has never smoked. She has never been exposed to tobacco smoke. She has never used smokeless tobacco.  Alcohol: not discussed    Medication Adherence/Access: no issues reported    Hypertension   Hydrochlorothiazide 12.5mg daily  Hoping to stop blood pressure medication eventually with weight loss. Checking blood pressure at work: 118-128/70s-80s.     Weight Management   Naltrexone 25mg twice daily  Initially had nausea for 3 weeks but that has resolved. She hasn't felt as hungry. Smaller portions.   She is losing around 2 lbs/week and prefers to have a gradual weight loss. Prefers naltrexone over GLP-1 for this reason.  Wants to lose at least 20 lbs and be able to maintain. She is 199 lbs currently.  Nutrition/Eating Habits: still has sugar cravings but avoids buying snacks  Exercise/Activity: Enjoys working outside in the yard, walks 3x/week, and  "active at her job   Medication History:  none     Wt Readings from Last 4 Encounters:   05/02/24 216 lb (98 kg)   01/19/24 227 lb 7 oz (103.2 kg)   02/18/22 218 lb (98.9 kg)   07/14/20 215 lb 6.4 oz (97.7 kg)     Estimated body mass index is 32.84 kg/m  as calculated from the following:    Height as of 5/2/24: 5' 8\" (1.727 m).    Weight as of 5/2/24: 216 lb (98 kg).    Mild intermittent asthma without complication:   Arnuity Ellipta 1 puff daily - patient reports she uses when she has viral-induced bronchitis. Was recommended to take daily but has not done so yet  Albuterol 90mcg HFA as needed  No issues reported today.      Today's Vitals: There were no vitals taken for this visit.  ----------------      I spent 21 minutes with this patient today. A copy of the visit note was provided to the patient's provider(s).    A summary of these recommendations was sent via [facility].    Liseth Dash, Carlos EnriqueD  Medication Therapy Management (MTM) Pharmacist    Telemedicine Visit Details  Type of service:  Telephone visit  Start Time: 11:00 AM  End Time: 11:21 AM     Medication Therapy Recommendations  No medication therapy recommendations to display   "

## 2024-06-07 ENCOUNTER — VIRTUAL VISIT (OUTPATIENT)
Dept: PHARMACY | Facility: CLINIC | Age: 59
End: 2024-06-07
Payer: COMMERCIAL

## 2024-06-07 DIAGNOSIS — E66.09 CLASS 1 OBESITY DUE TO EXCESS CALORIES WITH SERIOUS COMORBIDITY AND BODY MASS INDEX (BMI) OF 33.0 TO 33.9 IN ADULT: Primary | ICD-10-CM

## 2024-06-07 DIAGNOSIS — I10 ESSENTIAL (PRIMARY) HYPERTENSION: ICD-10-CM

## 2024-06-07 DIAGNOSIS — J45.20 MILD INTERMITTENT ASTHMA WITHOUT COMPLICATION: ICD-10-CM

## 2024-06-07 DIAGNOSIS — E66.811 CLASS 1 OBESITY DUE TO EXCESS CALORIES WITH SERIOUS COMORBIDITY AND BODY MASS INDEX (BMI) OF 33.0 TO 33.9 IN ADULT: Primary | ICD-10-CM

## 2024-06-07 PROCEDURE — 99607 MTMS BY PHARM ADDL 15 MIN: CPT | Mod: 93

## 2024-06-07 PROCEDURE — 99606 MTMS BY PHARM EST 15 MIN: CPT | Mod: 93

## 2024-06-07 NOTE — PATIENT INSTRUCTIONS
"Recommendations from today's MTM visit:                                                         Congrats on the weight loss so far! No medication changes today.     Follow-up:   Appointments in Next Year      Aug 08, 2024 11:30 AM  (Arrive by 11:25 AM)  Provider Visit with Mary Winn MD  [facility] ([facility] ) [phone]            It was great speaking with you today.  I value your experience and would be very thankful for your time in providing feedback in our clinic survey. In the next few days, you may receive an email or text message from [facility] [employer] with a link to a survey related to your  clinical pharmacist.\"     To schedule another MTM appointment, please call the clinic directly or you may call the MTM scheduling line at [phone].    My Clinical Pharmacist's contact information:                                                      Please feel free to contact me with any questions or concerns you have.      Liseth Dash, PharmD  Medication Therapy Management (MTM) Pharmacist   "

## 2024-07-08 ENCOUNTER — TRANSFERRED RECORDS (OUTPATIENT)
Dept: HEALTH INFORMATION MANAGEMENT | Facility: CLINIC | Age: 59
End: 2024-07-08
Payer: COMMERCIAL

## 2024-09-14 DIAGNOSIS — E03.8 SUBCLINICAL HYPOTHYROIDISM: ICD-10-CM

## 2024-09-16 RX ORDER — LEVOTHYROXINE SODIUM 75 UG/1
75 TABLET ORAL DAILY
Qty: 90 TABLET | Refills: 0 | Status: SHIPPED | OUTPATIENT
Start: 2024-09-16

## 2024-09-30 ENCOUNTER — VIRTUAL VISIT (OUTPATIENT)
Dept: FAMILY MEDICINE | Facility: CLINIC | Age: 59
End: 2024-09-30
Payer: COMMERCIAL

## 2024-09-30 DIAGNOSIS — Z12.31 VISIT FOR SCREENING MAMMOGRAM: ICD-10-CM

## 2024-09-30 DIAGNOSIS — E66.811 CLASS 1 OBESITY DUE TO EXCESS CALORIES WITH SERIOUS COMORBIDITY AND BODY MASS INDEX (BMI) OF 33.0 TO 33.9 IN ADULT: ICD-10-CM

## 2024-09-30 DIAGNOSIS — N95.1 MENOPAUSAL SYNDROME (HOT FLASHES): Primary | ICD-10-CM

## 2024-09-30 DIAGNOSIS — E66.09 CLASS 1 OBESITY DUE TO EXCESS CALORIES WITH SERIOUS COMORBIDITY AND BODY MASS INDEX (BMI) OF 33.0 TO 33.9 IN ADULT: ICD-10-CM

## 2024-09-30 PROCEDURE — G2211 COMPLEX E/M VISIT ADD ON: HCPCS | Mod: 95 | Performed by: FAMILY MEDICINE

## 2024-09-30 PROCEDURE — 99214 OFFICE O/P EST MOD 30 MIN: CPT | Mod: 95 | Performed by: FAMILY MEDICINE

## 2024-09-30 RX ORDER — ESTRADIOL 0.05 MG/D
1 PATCH, EXTENDED RELEASE TRANSDERMAL
Qty: 24 PATCH | Refills: 3 | Status: SHIPPED | OUTPATIENT
Start: 2024-09-30

## 2024-09-30 NOTE — PROGRESS NOTES
"Gualberto is a 59 year old who is being evaluated via a billable video visit.    How would you like to obtain your AVS? MyChart  If the video visit is dropped, the invitation should be resent by: Text to cell phone: [phone]  Will anyone else be joining your video visit? No      Assessment & Plan     Menopausal syndrome (hot flashes)  Some improvement with low dose estradiol initiation in May; would like to further optimize vasomotor sx so dose increase to 0.05mg was sent  - estradiol (VIVELLE-DOT) 0.05 MG/24HR bi-weekly patch; Place 1 patch over 96 hours onto the skin twice a week.    Class 1 obesity due to excess calories with serious comorbidity and body mass index (BMI) of 33.0 to 33.9 in adult  Discussed option for Wegovy/Zepbound as approved FDA medications (previously used in diabetics under brand name Ozempic/Mounjaro respectively) for indications of weight loss augmentation as a weekly injectable medication. Risks for thyroid C-cell tumors, and avoidance in family or personal hx of MTC or MEN 2 reviewed.    - Reviewed mechanism of action and side effect profile.   - Recommended MTM referral to meet with our pharmacist to evaluate coverage, provide medication education, review injection technique with demo pen and pt was agreeable.   - Reviewed risks for muscle mass loss and recommendation for strength training program.     - tirzepatide-Weight Management (ZEPBOUND) 7.5 MG/0.5ML prefilled pen; Inject 0.5 mLs (7.5 mg) subcutaneously every 7 days.  - tirzepatide-Weight Management (ZEPBOUND) 2.5 MG/0.5ML prefilled pen; Inject 0.5 mLs (2.5 mg) subcutaneously every 7 days.  - tirzepatide-Weight Management (ZEPBOUND) 5 MG/0.5ML prefilled pen; Inject 0.5 mLs (5 mg) subcutaneously every 7 days.    Visit for screening mammogram  - MA Screening Bilateral w/ Cj; Future          BMI  Estimated body mass index is 32.84 kg/m  as calculated from the following:    Height as of 5/2/24: 1.727 m (5' 8\").    Weight as of " 5/2/24: 98 kg (216 lb).   Weight management plan: Discussed healthy diet and exercise guidelines      Follow-up 3-4 months    The longitudinal plan of care for the diagnosis(es)/condition(s) as documented were addressed during this visit. Due to the added complexity in care, I will continue to support Gualberto in the subsequent management and with ongoing continuity of care.    Subjective   Gualberto is a 59 year old, presenting for the following health issues:  RECHECK      9/30/2024     4:42 PM   Additional Questions   Roomed by Barb VARGAS MA     History of Present Illness       Reason for visit:  Follow up    She eats 2-3 servings of fruits and vegetables daily.She consumes 0 sweetened beverage(s) daily.She exercises with enough effort to increase her heart rate 10 to 19 minutes per day.  She exercises with enough effort to increase her heart rate 3 or less days per week.   She is taking medications regularly.       In May we started her on Estradiol patches (0.25mg twice weekly Vivelle dot)- it has reduced some of the worst of her hot flashes in the mornings; still getting warm but not the full flush/sweating  Still feels warm at night.     Naltrexone for weight loss- 14% weight loss; but plateau now at 205lbs   Ready to try the GLP1 now (has met with MTM in the past about these options.     She is a very active person, on her feet at work, busy weekends gardening and swimming    She has an upcoming vacation and after that might be interested in the GLP1 end of Oct/after that vacation to [place]                  Objective           Vitals:  No vitals were obtained today due to virtual visit.    Physical Exam   GENERAL: alert and no distress  EYES: Eyes grossly normal to inspection.  No discharge or erythema, or obvious scleral/conjunctival abnormalities.  RESP: No audible wheeze, cough, or visible cyanosis.    SKIN: Visible skin clear. No significant rash, abnormal pigmentation or lesions.  NEURO: Cranial nerves grossly  intact.  Mentation and speech appropriate for age.  PSYCH: Appropriate affect, tone, and pace of words          Video-Visit Details    Type of service:  Video Visit   Originating Location (pt. Location): Home    Distant Location (provider location):  On-site  Platform used for Video Visit: Shama  Signed Electronically by: Mary Winn MD

## 2024-10-02 ENCOUNTER — TELEPHONE (OUTPATIENT)
Dept: FAMILY MEDICINE | Facility: CLINIC | Age: 59
End: 2024-10-02
Payer: COMMERCIAL

## 2024-10-02 DIAGNOSIS — E66.09 CLASS 1 OBESITY DUE TO EXCESS CALORIES WITH SERIOUS COMORBIDITY AND BODY MASS INDEX (BMI) OF 33.0 TO 33.9 IN ADULT: Primary | ICD-10-CM

## 2024-10-02 DIAGNOSIS — E66.811 CLASS 1 OBESITY DUE TO EXCESS CALORIES WITH SERIOUS COMORBIDITY AND BODY MASS INDEX (BMI) OF 33.0 TO 33.9 IN ADULT: Primary | ICD-10-CM

## 2024-10-02 NOTE — TELEPHONE ENCOUNTER
[facility]  Fax:[phone]  Spec: [phone]  MO: [phone]

## 2024-10-07 NOTE — TELEPHONE ENCOUNTER
Retail Pharmacy Prior Authorization Team   Phone: [phone]    PA Initiation    Medication: ZEPBOUND 2.5 MG/0.5ML SC SOAJ  Insurance Company: [employer] - Phone [phone] Fax [phone]  Pharmacy Filling the Rx: [facility] - [place], [place] - [address]  Filling Pharmacy Phone: [phone]  Filling Pharmacy Fax:    Start Date: 10/7/2024

## 2024-10-07 NOTE — TELEPHONE ENCOUNTER
Please notify pt that the Zepbound wasn't covered by her insurance- did she want to meet with our MTM pharmacist to review options further? Or try the compounded option through [employer] for approx retail price $300-500/month pending dose/amount

## 2024-10-07 NOTE — TELEPHONE ENCOUNTER
PRIOR AUTHORIZATION DENIED    Medication: ZEPBOUND 2.5 MG/0.5ML SC SOAJ  Insurance Company: [employer] - Phone [phone] Fax [phone]  Denial Date: 10/7/2024  Denial Rational:           Appeal Information: Drug exclusions can not be appealed. This medication will not be covered by the prescription plan for any reason. The drug is not on formulary and there are no loopholes to gaining approval.     Patient Notified: No

## 2024-10-08 NOTE — TELEPHONE ENCOUNTER
Patient returning call. She would like to move forward meeting with [facility] pharmacist. She will also call her insurance company to see what her options are.     Patient prefers [employer] messaging. Please let her know once referral is placed.

## 2024-10-11 ENCOUNTER — TELEPHONE (OUTPATIENT)
Dept: FAMILY MEDICINE | Facility: CLINIC | Age: 59
End: 2024-10-11
Payer: COMMERCIAL

## 2024-10-11 NOTE — TELEPHONE ENCOUNTER
[facility] referral from: [facility] visit (referral by provider)    MT referral outreach attempt #2 on October 11, 2024 at 12:33 PM      Outcome: Patient not reachable after several attempts, sent [employer] message    Use bcbs fully insured for the carrier/Plan on the flowsheet      MyChart Message Sent    See Oziel  [facility]   [phone]

## 2024-11-18 DIAGNOSIS — F51.01 PRIMARY INSOMNIA: ICD-10-CM

## 2024-11-19 RX ORDER — DOXEPIN HYDROCHLORIDE 10 MG/1
10 CAPSULE ORAL AT BEDTIME
Qty: 90 CAPSULE | Refills: 0 | Status: SHIPPED | OUTPATIENT
Start: 2024-11-19

## 2024-11-27 ENCOUNTER — TELEPHONE (OUTPATIENT)
Dept: FAMILY MEDICINE | Facility: CLINIC | Age: 59
End: 2024-11-27
Payer: COMMERCIAL

## 2024-11-27 NOTE — TELEPHONE ENCOUNTER
Prior Authorization  Please initiate PA, med/dosage not covered by insurance.    Medication: MOUNJARO 2.5 MG/0.5ML SOAJ     Insurance Name: [employer]  Insurance ID: [id number]  Cover My Meds Key: [id number]    Pharmacy:    [id number] [address] [facility]

## 2024-11-27 NOTE — TELEPHONE ENCOUNTER
PA Initiation    Medication: MOUNJARO 2.5 MG/0.5ML SC SOAJ  Insurance Company:    Pharmacy Filling the Rx: [id number] [place], [place] - [address]  Filling Pharmacy Phone: [phone]  Filling Pharmacy Fax: [phone]  Start Date: 11/27/2024

## 2024-11-29 NOTE — TELEPHONE ENCOUNTER
PRIOR AUTHORIZATION DENIED    Medication: MOUNJARO 2.5 MG/0.5ML SC SOAJ  Insurance Company:    Denial Date: 11/29/2024  Denial Reason(s): SEE ATTACHED  Appeal Information: SEE ATTACHED  Patient Notified: NO

## 2024-12-04 NOTE — TELEPHONE ENCOUNTER
Please let pt know the PA was denied for Mounjaro unfortunately and sounds like per Liseth the GLPS are not really an option (out of pocket retail cost of compounded semaglutide/Wegovy is $200-300/month)

## 2024-12-04 NOTE — TELEPHONE ENCOUNTER
12-4-24  Called pt informed her of the below, pt understands PA was denied.  CARL METZ- pt wants to know how long she would have to be on this medication if she were to pay? Please follow up w/pt  Clementina

## 2024-12-05 NOTE — TELEPHONE ENCOUNTER
Returned patient's call. She will continue to think about compounded semaglutide.    Liseth Dash, PharmD  Medication Therapy Management (MTM) Pharmacist

## 2024-12-14 DIAGNOSIS — E03.8 SUBCLINICAL HYPOTHYROIDISM: ICD-10-CM

## 2024-12-16 RX ORDER — LEVOTHYROXINE SODIUM 75 UG/1
75 TABLET ORAL DAILY
Qty: 90 TABLET | Refills: 2 | Status: SHIPPED | OUTPATIENT
Start: 2024-12-16

## 2025-01-05 DIAGNOSIS — I10 ESSENTIAL HYPERTENSION: ICD-10-CM

## 2025-01-07 RX ORDER — HYDROCHLOROTHIAZIDE 12.5 MG/1
12.5 TABLET ORAL DAILY
Qty: 30 TABLET | Refills: 0 | Status: SHIPPED | OUTPATIENT
Start: 2025-01-07

## 2025-01-22 ENCOUNTER — HOSPITAL ENCOUNTER (OUTPATIENT)
Dept: MAMMOGRAPHY | Facility: CLINIC | Age: 60
Discharge: HOME | End: 2025-01-22
Attending: FAMILY MEDICINE
Payer: COMMERCIAL

## 2025-01-22 DIAGNOSIS — Z12.31 VISIT FOR SCREENING MAMMOGRAM: ICD-10-CM

## 2025-01-22 PROCEDURE — 77063 BREAST TOMOSYNTHESIS BI: CPT

## 2025-01-22 PROCEDURE — 77067 SCR MAMMO BI INCL CAD: CPT

## 2025-01-28 ENCOUNTER — TELEPHONE (OUTPATIENT)
Dept: FAMILY MEDICINE | Facility: CLINIC | Age: 60
End: 2025-01-28
Payer: COMMERCIAL

## 2025-01-28 NOTE — TELEPHONE ENCOUNTER
Prior Authorization Retail Medication Request    Medication/Dose: MOUNJARO 2.5 MG/0.5ML SOAJ  Diagnosis and ICD code (if different than what is on RX):  see chart  New/renewal/insurance change PA/secondary ins. PA:  Previously Tried and Failed:  see chart  Rationale:  see chart    Insurance   Primary: [facility]  Insurance ID:  [id number]       Clinic Information  Preferred routing pool for dept communication: [facility] Nurse Pool    Cover My Meds Key: [id number]

## 2025-01-31 NOTE — TELEPHONE ENCOUNTER
Retail Pharmacy Prior Authorization Team   Phone: [phone]    PA Initiation    Medication: MOUNJARO 2.5 MG/0.5ML SC SOAJ  Insurance Company: [facility] - Phone [phone] Fax [phone]  Pharmacy Filling the Rx: [id number] [place], [place] - [address]  Filling Pharmacy Phone: [phone]  Filling Pharmacy Fax:    Start Date: 1/31/2025    GELY CHEUNG (Wilson: [id number])

## 2025-02-03 NOTE — TELEPHONE ENCOUNTER
PRIOR AUTHORIZATION DENIED    Medication: MOUNJARO 2.5 MG/0.5ML SC SOAJ  Insurance Company: [employer] [place] - Phone [phone] Fax [phone]  Denial Date: 2/2/2025  Denial Reason(s): MUST HAVE TYPE 2 DIABETES AND PROVIDE DOCUMENTATION CONFIRMING DX      Appeal Information: IF THE PROVIDER WOULD LIKE TO APPEAL THIS DECISION PLEASE PROVIDE THE PA TEAM WITH A LETTER OF MEDICAL NECESSITY      Patient Notified: NO

## 2025-02-06 ASSESSMENT — ASTHMA QUESTIONNAIRES
ACT_TOTALSCORE: 25
QUESTION_5 LAST FOUR WEEKS HOW WOULD YOU RATE YOUR ASTHMA CONTROL: COMPLETELY CONTROLLED
QUESTION_3 LAST FOUR WEEKS HOW OFTEN DID YOUR ASTHMA SYMPTOMS (WHEEZING, COUGHING, SHORTNESS OF BREATH, CHEST TIGHTNESS OR PAIN) WAKE YOU UP AT NIGHT OR EARLIER THAN USUAL IN THE MORNING: NOT AT ALL
QUESTION_1 LAST FOUR WEEKS HOW MUCH OF THE TIME DID YOUR ASTHMA KEEP YOU FROM GETTING AS MUCH DONE AT WORK, SCHOOL OR AT HOME: NONE OF THE TIME
ACT_TOTALSCORE: 25
QUESTION_4 LAST FOUR WEEKS HOW OFTEN HAVE YOU USED YOUR RESCUE INHALER OR NEBULIZER MEDICATION (SUCH AS ALBUTEROL): NOT AT ALL
QUESTION_2 LAST FOUR WEEKS HOW OFTEN HAVE YOU HAD SHORTNESS OF BREATH: NOT AT ALL

## 2025-02-11 ENCOUNTER — VIRTUAL VISIT (OUTPATIENT)
Dept: FAMILY MEDICINE | Facility: CLINIC | Age: 60
End: 2025-02-11
Payer: COMMERCIAL

## 2025-02-11 DIAGNOSIS — E03.8 SUBCLINICAL HYPOTHYROIDISM: ICD-10-CM

## 2025-02-11 DIAGNOSIS — I10 ESSENTIAL HYPERTENSION: Primary | ICD-10-CM

## 2025-02-11 DIAGNOSIS — N95.1 MENOPAUSAL SYNDROME (HOT FLASHES): ICD-10-CM

## 2025-02-11 DIAGNOSIS — F51.01 PRIMARY INSOMNIA: ICD-10-CM

## 2025-02-11 DIAGNOSIS — R73.03 PREDIABETES: ICD-10-CM

## 2025-02-11 PROCEDURE — 98006 SYNCH AUDIO-VIDEO EST MOD 30: CPT | Performed by: FAMILY MEDICINE

## 2025-02-11 RX ORDER — DOXEPIN HYDROCHLORIDE 10 MG/1
10 CAPSULE ORAL AT BEDTIME
Qty: 90 CAPSULE | Refills: 1 | Status: SHIPPED | OUTPATIENT
Start: 2025-02-11

## 2025-02-11 RX ORDER — ESTRADIOL 0.1 MG/D
1 FILM, EXTENDED RELEASE TRANSDERMAL
Qty: 24 PATCH | Refills: 1 | Status: SHIPPED | OUTPATIENT
Start: 2025-02-13

## 2025-02-11 RX ORDER — HYDROCHLOROTHIAZIDE 12.5 MG/1
12.5 TABLET ORAL DAILY
Qty: 90 TABLET | Refills: 1 | Status: SHIPPED | OUTPATIENT
Start: 2025-02-11

## 2025-02-11 NOTE — PROGRESS NOTES
Gualberto is a 59 year old who is being evaluated via a billable video visit.    How would you like to obtain your AVS? MyChart  If the video visit is dropped, the invitation should be resent by: Text to cell phone: [phone]  Will anyone else be joining your video visit? No      Assessment & Plan     Essential hypertension  Has been >1yr since last BMP so we will get that updated and see her for routine physical in May Refill sent for HCTZ  - hydroCHLOROthiazide 12.5 MG tablet; Take 1 tablet (12.5 mg) by mouth daily.  - Basic metabolic panel  (Ca, Cl, CO2, Creat, Gluc, K, Na, BUN); Future    Prediabetes  Stable, planning to recheck at physical in a few months    Menopausal syndrome (hot flashes)  Suboptimally controlled; reviewed how to use current 0.05mg patches by cutting in 1/2 and placing 1.5 patches for the next couple weeks, then will have new Rx for the 0.1mg dose available for her to try. If not effective then will wean down/off.   - estradiol (VIVELLE-DOT) 0.1 MG/24HR bi-weekly patch; Place 1 patch over 96 hours onto the skin twice a week.    Subclinical hypothyroidism  Due for TSH as well. Will update with labs as above  - TSH with free T4 reflex; Future    Primary insomnia  Refill sent; doing ok with this overall  - doxepin (SINEQUAN) 10 MG capsule; Take 1 capsule (10 mg) by mouth at bedtime.            Follow up  physical in 3 months      The longitudinal plan of care for the diagnosis(es)/condition(s) as documented were addressed during thisvisit. Due to the added complexity in care, I will continue to support Gualberto in the subsequent management and with ongoing continuity of care.      Subjective   Gualberto is a 59 year old, presenting for the following health issues:  Recheck Medication (Covid vaccine in Nov, pt is going on Vacation, wanting to know if needs another one)      2/11/2025    11:19 AM   Additional Questions   Roomed by Barb VARGAS MA     History of Present Illness       Reason for visit:   "Follow up    She eats 2-3 servings of fruits and vegetables daily.She consumes 0 sweetened beverage(s) daily.She exercises with enough effort to increase her heart rate 10 to 19 minutes per day.  She exercises with enough effort to increase her heart rate 3 or less days per week.   She is taking medications regularly.         Going to on vacation and wondering about precautions; UTD on COVID and flu shots.   She had a potassium level checked last fall      She plans to start the compounded Wegovy after her vacation.     Flu shot in October 2 weeks after the COVID 9/29/24 date; through work      HTN: doing well with hydrochlorothiazide for now; feels like her BP actually is symptomatic and anxiety sensation in her chest and that's been normal since starting the medication      HRT:  she hasn't noticed much improvement with the 0.5mg estrogen patch. She still sleeps \"hot\" and very uncomfortable for sleep has a fan; not getting flushes but night sweats      Last bone density scan 2 years ago. She will try to get these records sent as she never heard back from the ob/gyn clinic where she had this done            1/19/2024     1:42 PM 5/2/2024    12:39 PM 2/10/2025     4:08 PM   PHQ   PHQ-9 Total Score 1 1 2    Q9: Thoughts of better off dead/self-harm past 2 weeks Not at all Not at all Not at all       Patient-reported         3/2/2023     3:42 PM 1/19/2024     1:42 PM 5/2/2024    12:40 PM   ELIJAH-7 SCORE   Total Score 0 (minimal anxiety) 1 (minimal anxiety) 0 (minimal anxiety)   Total Score 0 1 0                     Objective    Vitals - Patient Reported  Weight (Patient Reported): 92.5 kg (204 lb)  Height (Patient Reported): 172.7 cm (5' 8\")  BMI (Based on Pt Reported Ht/Wt): 31.02        Physical Exam   GENERAL: alert and no distress  EYES: Eyes grossly normal to inspection.  No discharge or erythema, or obvious scleral/conjunctival abnormalities.  RESP: No audible wheeze, cough, or visible cyanosis.    SKIN: Visible " skin clear. No significant rash, abnormal pigmentation or lesions.  NEURO: Cranial nerves grossly intact.  Mentation and speech appropriate for age.  PSYCH: Appropriate affect, tone, and pace of words          Video-Visit Details    Type of service:  Video Visit   Originating Location (pt. Location): Home    Distant Location (provider location):  On-site  Platform used for Video Visit: Shama  Signed Electronically by: Mary Winn MD

## 2025-02-12 ENCOUNTER — LAB (OUTPATIENT)
Dept: LAB | Facility: CLINIC | Age: 60
End: 2025-02-12
Payer: COMMERCIAL

## 2025-02-12 DIAGNOSIS — E03.8 SUBCLINICAL HYPOTHYROIDISM: ICD-10-CM

## 2025-02-12 DIAGNOSIS — I10 ESSENTIAL HYPERTENSION: ICD-10-CM

## 2025-02-12 LAB
ANION GAP SERPL CALCULATED.3IONS-SCNC: 10 MMOL/L (ref 7–15)
BUN SERPL-MCNC: 6 MG/DL (ref 6–20)
CALCIUM SERPL-MCNC: 9.3 MG/DL (ref 8.8–10.4)
CHLORIDE SERPL-SCNC: 101 MMOL/L (ref 98–107)
CREAT SERPL-MCNC: 0.66 MG/DL (ref 0.51–0.95)
EGFRCR SERPLBLD CKD-EPI 2021: >90 ML/MIN/1.73M2
GLUCOSE SERPL-MCNC: 133 MG/DL (ref 70–99)
HCO3 SERPL-SCNC: 27 MMOL/L (ref 22–29)
POTASSIUM SERPL-SCNC: 4.6 MMOL/L (ref 3.4–5.3)
SODIUM SERPL-SCNC: 138 MMOL/L (ref 135–145)
TSH SERPL DL<=0.005 MIU/L-ACNC: 2.11 UIU/ML (ref 0.3–4.2)

## 2025-02-12 PROCEDURE — 84443 ASSAY THYROID STIM HORMONE: CPT

## 2025-02-12 PROCEDURE — 80048 BASIC METABOLIC PNL TOTAL CA: CPT

## 2025-02-12 PROCEDURE — 36415 COLL VENOUS BLD VENIPUNCTURE: CPT

## 2025-03-03 ENCOUNTER — MYC MEDICAL ADVICE (OUTPATIENT)
Dept: PHARMACY | Facility: CLINIC | Age: 60
End: 2025-03-03
Payer: COMMERCIAL

## 2025-03-04 DIAGNOSIS — E66.811 CLASS 1 OBESITY DUE TO EXCESS CALORIES WITH SERIOUS COMORBIDITY AND BODY MASS INDEX (BMI) OF 33.0 TO 33.9 IN ADULT: Primary | ICD-10-CM

## 2025-03-04 DIAGNOSIS — E66.09 CLASS 1 OBESITY DUE TO EXCESS CALORIES WITH SERIOUS COMORBIDITY AND BODY MASS INDEX (BMI) OF 33.0 TO 33.9 IN ADULT: Primary | ICD-10-CM

## 2025-04-02 ENCOUNTER — PATIENT OUTREACH (OUTPATIENT)
Dept: CARE COORDINATION | Facility: CLINIC | Age: 60
End: 2025-04-02
Payer: COMMERCIAL

## 2025-04-03 ENCOUNTER — E-VISIT (OUTPATIENT)
Dept: URGENT CARE | Facility: CLINIC | Age: 60
End: 2025-04-03
Payer: COMMERCIAL

## 2025-04-03 DIAGNOSIS — R30.0 DYSURIA: Primary | ICD-10-CM

## 2025-04-03 RX ORDER — NITROFURANTOIN 25; 75 MG/1; MG/1
100 CAPSULE ORAL 2 TIMES DAILY
Qty: 10 CAPSULE | Refills: 0 | Status: SHIPPED | OUTPATIENT
Start: 2025-04-03 | End: 2025-04-08

## 2025-04-03 NOTE — PATIENT INSTRUCTIONS
Dear Gualberto Saunders    After reviewing your responses, I've been able to diagnose you with a urinary tract infection, which is a common infection of the bladder with bacteria.  This is not a sexually transmitted infection, though urinating immediately after intercourse can help prevent infections.  Drinking lots of fluids is also helpful to clear your current infection and prevent the next one.      I have sent a prescription for antibiotics to your pharmacy to treat this infection.    It is important that you take all of your prescribed medication even if your symptoms are improving after a few doses.  Taking all of your medicine helps prevent the symptoms from returning.     If your symptoms worsen, you develop pain in your back or stomach, develop fevers, or are not improving in 5 days, please contact your primary care provider for an appointment or visit any of our convenient Walk-in or Urgent Care Centers to be seen, which can be found on our website here.    Thanks again for choosing us as your health care partner,    TANYA Butterfield CNP  Urinary Tract Infection (UTI) in Women: Care Instructions  Overview     A urinary tract infection (UTI) is an infection caused by bacteria. It can happen anywhere in the urinary tract. A UTI can happen in the:  Kidneys.  Ureters, the tubes that connect the kidneys to the bladder.  Bladder.  Urethra, where the urine comes out.  Most UTIs are bladder infections. They often cause pain or burning when you urinate.  Most UTIs can be cured with antibiotics. If you are prescribed antibiotics, be sure to complete your treatment so that the infection does not get worse.  Follow-up care is a key part of your treatment and safety. Be sure to make and go to all appointments, and call your doctor if you are having problems. It's also a good idea to know your test results and keep a list of the medicines you take.  How can you care for yourself at home?  Take your antibiotics as  "directed. Do not stop taking them just because you feel better. You need to take the full course of antibiotics.  Drink extra water and other fluids for the next day or two. This will help make the urine less concentrated and help wash out the bacteria that are causing the infection. (If you have kidney, heart, or liver disease and have to limit fluids, talk with your doctor before you increase the amount of fluids you drink.)  Avoid drinks that are carbonated or have caffeine. They can irritate the bladder.  Urinate often. Try to empty your bladder each time.  To relieve pain, take a hot bath or lay a heating pad set on low over your lower belly or genital area. Never go to sleep with a heating pad in place.  To prevent UTIs  Drink plenty of water each day. This helps you urinate often, which clears bacteria from your system. (If you have kidney, heart, or liver disease and have to limit fluids, talk with your doctor before you increase the amount of fluids you drink.)  Urinate when you need to.  If you are sexually active, urinate right after you have sex.  Change sanitary pads often.  Avoid douches, bubble baths, feminine hygiene sprays, and other feminine hygiene products that have deodorants.  After going to the bathroom, wipe from front to back.  When should you call for help?   Call your doctor now or seek immediate medical care if:    You have new or worse fever, chills, nausea, or vomiting.     You have new pain in your back just below your rib cage. This is called flank pain.     There is new blood or pus in your urine.     You have any problems with your antibiotic medicine.   Watch closely for changes in your health, and be sure to contact your doctor if:    You are not getting better after taking an antibiotic for 2 days.     Your symptoms go away but then come back.   Where can you learn more?  Go to https://www.healthwise.net/patiented  Enter K848 in the search box to learn more about \"Urinary Tract " "Infection (UTI) in Women: Care Instructions.\"  Current as of: April 30, 2024  Content Version: 14.4    5298-7410 [employer].   Care instructions adapted under license by your healthcare professional. If you have questions about a medical condition or this instruction, always ask your healthcare professional. [employer] disclaims any warranty or liability for your use of this information.    "

## 2025-04-16 ENCOUNTER — PATIENT OUTREACH (OUTPATIENT)
Dept: CARE COORDINATION | Facility: CLINIC | Age: 60
End: 2025-04-16
Payer: COMMERCIAL

## 2025-04-24 ENCOUNTER — MYC MEDICAL ADVICE (OUTPATIENT)
Dept: FAMILY MEDICINE | Facility: CLINIC | Age: 60
End: 2025-04-24
Payer: COMMERCIAL

## 2025-04-24 ENCOUNTER — TELEPHONE (OUTPATIENT)
Dept: FAMILY MEDICINE | Facility: CLINIC | Age: 60
End: 2025-04-24
Payer: COMMERCIAL

## 2025-04-24 DIAGNOSIS — E66.811 CLASS 1 OBESITY DUE TO EXCESS CALORIES WITH SERIOUS COMORBIDITY AND BODY MASS INDEX (BMI) OF 33.0 TO 33.9 IN ADULT: Primary | ICD-10-CM

## 2025-04-24 DIAGNOSIS — E66.09 CLASS 1 OBESITY DUE TO EXCESS CALORIES WITH SERIOUS COMORBIDITY AND BODY MASS INDEX (BMI) OF 33.0 TO 33.9 IN ADULT: Primary | ICD-10-CM

## 2025-04-24 DIAGNOSIS — E66.811 CLASS 1 OBESITY DUE TO EXCESS CALORIES WITH SERIOUS COMORBIDITY AND BODY MASS INDEX (BMI) OF 33.0 TO 33.9 IN ADULT: ICD-10-CM

## 2025-04-24 DIAGNOSIS — R73.03 PREDIABETES: ICD-10-CM

## 2025-04-24 DIAGNOSIS — E66.09 CLASS 1 OBESITY DUE TO EXCESS CALORIES WITH SERIOUS COMORBIDITY AND BODY MASS INDEX (BMI) OF 33.0 TO 33.9 IN ADULT: ICD-10-CM

## 2025-04-24 DIAGNOSIS — I10 ESSENTIAL HYPERTENSION: ICD-10-CM

## 2025-04-24 NOTE — TELEPHONE ENCOUNTER
Reason for Call:  Appointment Request    Patient requesting this type of appt:  Follow up     Requested provider: Mary Winn    Reason patient unable to be scheduled: Not within requested timeframe    When does patient want to be seen/preferred time:  Sometime in May or June     Comments: Patient stated that PCP wanted her to follow up sometime in May or June.     Could we send this information to you in "Mobile Location, IP"[place] or would you prefer to receive a phone call?:   Patient would prefer a phone call   Okay to leave a detailed message?: Yes at Cell number on file:    Telephone Information:   Mobile [phone]       Call taken on 4/24/2025 at 12:11 PM by Tommy Cui

## 2025-05-01 ENCOUNTER — TELEPHONE (OUTPATIENT)
Dept: FAMILY MEDICINE | Facility: CLINIC | Age: 60
End: 2025-05-01
Payer: COMMERCIAL

## 2025-05-01 NOTE — TELEPHONE ENCOUNTER
Prior Authorization Retail Medication Request    Medication/Dose: ZEPBOUND2.5 MG/0.5ML AUTO-INJECTORS  Diagnosis and ICD code (if different than what is on RX):  See Chart  New/renewal/insurance change PA/secondary ins. PA:  Previously Tried and Failed:  See Chart  Rationale:  See Chart    Insurance   Primary: [employer]  Insurance ID:  [id number]    Secondary (if applicable):   Insurance ID:       Pharmacy Information (if different than what is on RX)  [facility]  Phone:  [phone]  Fax:     Clinic Information  Preferred routing pool for dept communication: [facility]

## 2025-05-03 DIAGNOSIS — F41.9 ANXIETY: ICD-10-CM

## 2025-05-03 DIAGNOSIS — F32.0 MILD MAJOR DEPRESSION: ICD-10-CM

## 2025-05-05 RX ORDER — ESCITALOPRAM OXALATE 10 MG/1
10 TABLET ORAL
Qty: 90 TABLET | Refills: 1 | Status: SHIPPED | OUTPATIENT
Start: 2025-05-05

## 2025-05-05 NOTE — TELEPHONE ENCOUNTER
PA Initiation    Medication: ZEPBOUND 2.5 MG/0.5ML SC SOAJ  Insurance Company: [employer] [place] - Phone [phone] Fax [phone]  Pharmacy Filling the Rx: [facility] - [place], [place] - [address]  Filling Pharmacy Phone:    Filling Pharmacy Fax:    Start Date: 5/5/2025

## 2025-05-06 NOTE — TELEPHONE ENCOUNTER
PRIOR AUTHORIZATION DENIED    Medication: ZEPBOUND 2.5 MG/0.5ML SC SOAJ  Insurance Company: [employer] [place] - Phone [phone] Fax [phone]  Denial Date: 5/6/2025  Denial Reason(s): excluded from plan coverage  Appeal Information: na  Patient Notified: no    This prior authorization has been denied, medication/diagnosis is excluded from coverage. Patient is able to use [employer]. or another discount card to help with the cost of the medication. An appeal is NOT available on an excluded medication/diagnosis.

## 2025-05-19 DIAGNOSIS — E66.09 CLASS 1 OBESITY DUE TO EXCESS CALORIES WITH SERIOUS COMORBIDITY AND BODY MASS INDEX (BMI) OF 33.0 TO 33.9 IN ADULT: ICD-10-CM

## 2025-05-19 DIAGNOSIS — E66.811 CLASS 1 OBESITY DUE TO EXCESS CALORIES WITH SERIOUS COMORBIDITY AND BODY MASS INDEX (BMI) OF 33.0 TO 33.9 IN ADULT: ICD-10-CM

## 2025-05-19 RX ORDER — NALTREXONE HYDROCHLORIDE 50 MG/1
25 TABLET, FILM COATED ORAL 2 TIMES DAILY
Qty: 90 TABLET | Refills: 3 | Status: SHIPPED | OUTPATIENT
Start: 2025-05-19

## 2025-05-22 ENCOUNTER — OFFICE VISIT (OUTPATIENT)
Dept: FAMILY MEDICINE | Facility: CLINIC | Age: 60
End: 2025-05-22
Payer: COMMERCIAL

## 2025-05-22 VITALS
BODY MASS INDEX: 31.27 KG/M2 | HEIGHT: 68 IN | DIASTOLIC BLOOD PRESSURE: 74 MMHG | RESPIRATION RATE: 14 BRPM | HEART RATE: 84 BPM | WEIGHT: 206.3 LBS | OXYGEN SATURATION: 99 % | SYSTOLIC BLOOD PRESSURE: 138 MMHG

## 2025-05-22 DIAGNOSIS — E03.8 SUBCLINICAL HYPOTHYROIDISM: ICD-10-CM

## 2025-05-22 DIAGNOSIS — F32.0 MILD MAJOR DEPRESSION: ICD-10-CM

## 2025-05-22 DIAGNOSIS — L71.0 PERIORAL DERMATITIS: ICD-10-CM

## 2025-05-22 DIAGNOSIS — J45.20 MILD INTERMITTENT ASTHMA WITHOUT COMPLICATION: ICD-10-CM

## 2025-05-22 DIAGNOSIS — I10 ESSENTIAL HYPERTENSION: ICD-10-CM

## 2025-05-22 DIAGNOSIS — N95.1 MENOPAUSAL SYNDROME (HOT FLASHES): ICD-10-CM

## 2025-05-22 DIAGNOSIS — E66.811 CLASS 1 OBESITY DUE TO EXCESS CALORIES WITH SERIOUS COMORBIDITY AND BODY MASS INDEX (BMI) OF 33.0 TO 33.9 IN ADULT: Primary | ICD-10-CM

## 2025-05-22 DIAGNOSIS — E66.09 CLASS 1 OBESITY DUE TO EXCESS CALORIES WITH SERIOUS COMORBIDITY AND BODY MASS INDEX (BMI) OF 33.0 TO 33.9 IN ADULT: Primary | ICD-10-CM

## 2025-05-22 DIAGNOSIS — R73.03 PREDIABETES: ICD-10-CM

## 2025-05-22 DIAGNOSIS — F41.9 ANXIETY: ICD-10-CM

## 2025-05-22 LAB
CHOLEST SERPL-MCNC: 176 MG/DL
EST. AVERAGE GLUCOSE BLD GHB EST-MCNC: 111 MG/DL
FASTING STATUS PATIENT QL REPORTED: NO
HBA1C MFR BLD: 5.5 % (ref 0–5.6)
HDLC SERPL-MCNC: 47 MG/DL
HGB BLD-MCNC: 17.8 G/DL (ref 11.7–15.7)
LDLC SERPL CALC-MCNC: 101 MG/DL
MCV RBC AUTO: 99 FL (ref 78–100)
NONHDLC SERPL-MCNC: 129 MG/DL
TRIGL SERPL-MCNC: 138 MG/DL

## 2025-05-22 RX ORDER — METRONIDAZOLE TOPICAL 7.5 MG/G
GEL TOPICAL DAILY
Qty: 45 G | Refills: 2 | Status: SHIPPED | OUTPATIENT
Start: 2025-05-22

## 2025-05-22 RX ORDER — ALBUTEROL SULFATE 90 UG/1
2 INHALANT RESPIRATORY (INHALATION) EVERY 6 HOURS PRN
Qty: 18 G | Refills: 3 | Status: SHIPPED | OUTPATIENT
Start: 2025-05-22

## 2025-05-22 RX ORDER — HYDROCHLOROTHIAZIDE 12.5 MG/1
12.5 TABLET ORAL DAILY
Qty: 90 TABLET | Refills: 3 | Status: SHIPPED | OUTPATIENT
Start: 2025-05-22

## 2025-05-22 RX ORDER — DOXYCYCLINE 100 MG/1
100 CAPSULE ORAL 2 TIMES DAILY
Qty: 20 CAPSULE | Refills: 0 | Status: SHIPPED | OUTPATIENT
Start: 2025-05-22 | End: 2025-06-01

## 2025-05-22 RX ORDER — LEVOTHYROXINE SODIUM 75 UG/1
75 TABLET ORAL DAILY
Qty: 90 TABLET | Refills: 3 | Status: SHIPPED | OUTPATIENT
Start: 2025-05-22

## 2025-05-22 NOTE — PROGRESS NOTES
"  Assessment & Plan     Class 1 obesity due to excess calories with serious comorbidity and body mass index (BMI) of 33.0 to 33.9 in adult  - Weight loss progress noted with Zepbound, with 10lbs loss in the past 1 month since initiation.  - Increase to Zepbound 5mg weekly after last 2.5mg dose; with potential dose adjustments every month as needed.  - Consider strength-based training and increased protein intake to support muscle retention.  - Follow-up in about three months, which can be an e-visit, to assess weight and appetite and discuss potential dose adjustments.    Perioral dermatitis  - Flare-up noted on the right lower chin.  - Start updated metrogel and a 10-day course of doxycycline.     Menopausal syndrome (hot flashes)  Continues estradiol 0.1mg biweekly patch    Essential hypertension  BMP udpated 2/2025 and normal Cr/lytes. White coat BP elevation at clinic; normal on home readings. Continue current rx  - hydroCHLOROthiazide 12.5 MG tablet; Take 1 tablet (12.5 mg) by mouth daily.     Prediabetes  Stable, recheck A1c today     Subclinical hypothyroidism  TSH normal in Feb, continue Synthroid, rx refilled     Primary insomnia  Stable, on doxepin      Declines routine physical as she gets these at her ob/gyn; screening labs updated today          Consent was obtained from the patient to use an AI documentation tool in the creation of this note.          BMI  Estimated body mass index is 31.37 kg/m  as calculated from the following:    Height as of this encounter: 1.727 m (5' 8\").    Weight as of this encounter: 93.6 kg (206 lb 4.8 oz).           The longitudinal plan of care for the diagnosis(es)/condition(s) as documented were addressed during this visit. Due to the added complexity in care, I will continue to support Gualberto in the subsequent management and with ongoing continuity of care.    Subjective   Gualberto is a 59 year old, presenting for the following health issues:  RECHECK (Flare- up on right " side of lip )        2025    10:47 AM   Additional Questions   Roomed by Barb VARGAS MA     History of Present Illness       Reason for visit:  Follow up    She eats 2-3 servings of fruits and vegetables daily.She consumes 0 sweetened beverage(s) daily.She exercises with enough effort to increase her heart rate 10 to 19 minutes per day.  She exercises with enough effort to increase her heart rate 3 or less days per week.   She is taking medications regularly.   Gualberto Saunders, 59 years    Perioral Dermatitis  - Flare on the right lower chin for about a month  - No new makeup or skincare products used since COVID  - Uses  metrogel, no steroid use reported  - History of periodic flare-ups; usually managed on spironolactone/metrogel with derm but not able to see them until July    Weight Management  - Weight recorded as 227 lbs in 2024, 216lbs 1 month ago and now 206 lbs  - Experienced a 2-week interruption in semaglutide use due to travel  - Consumed hospital and fast food during travel  - Reports a more regular diet including protein shakes, small lunches, and protein villaloobs  - Alcohol use: enjoys wine occasionally  - Exercise plan: considering starting Pilates    Cystocele  - History of cystocele, possibly requiring a surgery/TBT again  - Symptoms include stress urinary incontinence, triggered by activities like jumping    Bone Density  - Normal bone density as of 2024    Medication and Lab Coordination  - Experienced a 2-week delay in spironolactone refill due to potassium lab results  - Reports always being at the high end for potassium and hemoglobin levels    Spironolactone for the perioral dermatitis      Wt Readings from Last 3 Encounters:   25 93.6 kg (206 lb 4.8 oz)   24 98 kg (216 lb)   24 103.2 kg (227 lb 7 oz)     Social History     Social History Narrative    She is an OR nurse at [employer].    , they have 3 daughters.  1 daughter, the  "meseret and grandchild (1-year-old) is currently living with Gualberto.    She enjoys walking 3 times per week, gardening, mowing the lawn for physical [employer] in the summer.  Occasional alcohol twice weekly, no smoking.  Mary Winn MD                         Objective    BP (!) 148/90 (BP Location: Left arm, Patient Position: Sitting, Cuff Size: Adult Regular)   Pulse 84   Resp 14   Ht 1.727 m (5' 8\")   Wt 93.6 kg (206 lb 4.8 oz)   SpO2 99%   BMI 31.37 kg/m    Body mass index is 31.37 kg/m .  Physical Exam   GENERAL: alert and no distress  EYES: Eyes grossly normal to inspection, PERRL and conjunctivae and sclerae normal  HENT: ear canals and TM's normal, nose and mouth without ulcers or lesions  NECK: no adenopathy, no asymmetry, masses, or scars  RESP: lungs clear to auscultation - no rales, rhonchi or wheezes  CV: regular rate and rhythm, normal S1 S2, no S3 or S4, no murmur, click or rub, no peripheral edema  ABDOMEN: soft, nontender, no hepatosplenomegaly, no masses and bowel sounds normal  MS: no gross musculoskeletal defects noted, no edema  SKIN: erythematous papules at right lower chin area sparing the vermillian border  NEURO: Normal strength and tone, mentation intact and speech normal  PSYCH: mentation appears normal, affect normal/bright            Signed Electronically by: Mary Winn MD    "

## 2025-05-23 ENCOUNTER — TRANSFERRED RECORDS (OUTPATIENT)
Dept: HEALTH INFORMATION MANAGEMENT | Facility: CLINIC | Age: 60
End: 2025-05-23
Payer: COMMERCIAL

## 2025-06-01 ENCOUNTER — MYC MEDICAL ADVICE (OUTPATIENT)
Dept: FAMILY MEDICINE | Facility: CLINIC | Age: 60
End: 2025-06-01
Payer: COMMERCIAL

## 2025-06-01 DIAGNOSIS — L71.0 PERIORAL DERMATITIS: ICD-10-CM

## 2025-06-02 RX ORDER — DOXYCYCLINE 100 MG/1
100 CAPSULE ORAL 2 TIMES DAILY
Qty: 40 CAPSULE | Refills: 0 | Status: SHIPPED | OUTPATIENT
Start: 2025-06-02 | End: 2025-06-22

## 2025-06-07 ENCOUNTER — HEALTH MAINTENANCE LETTER (OUTPATIENT)
Age: 60
End: 2025-06-07

## 2025-06-12 ENCOUNTER — TRANSFERRED RECORDS (OUTPATIENT)
Dept: HEALTH INFORMATION MANAGEMENT | Facility: CLINIC | Age: 60
End: 2025-06-12

## 2025-06-12 DIAGNOSIS — E66.09 CLASS 1 OBESITY DUE TO EXCESS CALORIES WITH SERIOUS COMORBIDITY AND BODY MASS INDEX (BMI) OF 33.0 TO 33.9 IN ADULT: ICD-10-CM

## 2025-06-12 DIAGNOSIS — E66.811 CLASS 1 OBESITY DUE TO EXCESS CALORIES WITH SERIOUS COMORBIDITY AND BODY MASS INDEX (BMI) OF 33.0 TO 33.9 IN ADULT: ICD-10-CM

## 2025-06-12 RX ORDER — TIRZEPATIDE 5 MG/.5ML
INJECTION, SOLUTION SUBCUTANEOUS
Qty: 2 ML | Refills: 3 | Status: SHIPPED | OUTPATIENT
Start: 2025-06-12

## 2025-06-20 ENCOUNTER — TRANSFERRED RECORDS (OUTPATIENT)
Dept: HEALTH INFORMATION MANAGEMENT | Facility: CLINIC | Age: 60
End: 2025-06-20
Payer: COMMERCIAL

## 2025-07-08 DIAGNOSIS — E66.811 CLASS 1 OBESITY DUE TO EXCESS CALORIES WITH SERIOUS COMORBIDITY AND BODY MASS INDEX (BMI) OF 33.0 TO 33.9 IN ADULT: ICD-10-CM

## 2025-07-08 DIAGNOSIS — E66.09 CLASS 1 OBESITY DUE TO EXCESS CALORIES WITH SERIOUS COMORBIDITY AND BODY MASS INDEX (BMI) OF 33.0 TO 33.9 IN ADULT: ICD-10-CM

## 2025-07-09 RX ORDER — TIRZEPATIDE 7.5 MG/.5ML
INJECTION, SOLUTION SUBCUTANEOUS
Qty: 2 ML | Refills: 3 | Status: SHIPPED | OUTPATIENT
Start: 2025-07-09

## 2025-07-28 ENCOUNTER — TRANSFERRED RECORDS (OUTPATIENT)
Dept: HEALTH INFORMATION MANAGEMENT | Facility: CLINIC | Age: 60
End: 2025-07-28
Payer: COMMERCIAL

## 2025-08-04 ENCOUNTER — E-VISIT (OUTPATIENT)
Dept: FAMILY MEDICINE | Facility: CLINIC | Age: 60
End: 2025-08-04
Payer: COMMERCIAL

## 2025-08-04 DIAGNOSIS — I10 ESSENTIAL HYPERTENSION: ICD-10-CM

## 2025-08-04 DIAGNOSIS — E66.811 CLASS 1 OBESITY DUE TO EXCESS CALORIES WITH SERIOUS COMORBIDITY AND BODY MASS INDEX (BMI) OF 33.0 TO 33.9 IN ADULT: Primary | ICD-10-CM

## 2025-08-04 DIAGNOSIS — E66.09 CLASS 1 OBESITY DUE TO EXCESS CALORIES WITH SERIOUS COMORBIDITY AND BODY MASS INDEX (BMI) OF 33.0 TO 33.9 IN ADULT: Primary | ICD-10-CM

## 2025-08-04 DIAGNOSIS — R73.03 PREDIABETES: ICD-10-CM

## 2025-08-10 DIAGNOSIS — F51.01 PRIMARY INSOMNIA: ICD-10-CM

## 2025-08-11 RX ORDER — DOXEPIN HYDROCHLORIDE 10 MG/1
10 CAPSULE ORAL AT BEDTIME
Qty: 90 CAPSULE | Refills: 0 | Status: SHIPPED | OUTPATIENT
Start: 2025-08-11